# Patient Record
Sex: MALE | Race: WHITE | NOT HISPANIC OR LATINO | Employment: FULL TIME | URBAN - METROPOLITAN AREA
[De-identification: names, ages, dates, MRNs, and addresses within clinical notes are randomized per-mention and may not be internally consistent; named-entity substitution may affect disease eponyms.]

---

## 2023-07-05 LAB
INR IN PPP BY COAGULATION ASSAY: 3 (ref 0.9–1.1)
PROTHROMBIN TIME (PT) IN PPP BY COAGULATION ASSAY: 33.7 SEC (ref 9.8–12.8)

## 2023-09-27 LAB
INR IN PPP BY COAGULATION ASSAY: 2.6 (ref 0.9–1.1)
PROTHROMBIN TIME (PT) IN PPP BY COAGULATION ASSAY: 29.3 SEC (ref 9.8–12.8)

## 2023-10-03 ENCOUNTER — DOCUMENTATION (OUTPATIENT)
Dept: PRIMARY CARE | Facility: CLINIC | Age: 63
End: 2023-10-03
Payer: COMMERCIAL

## 2023-10-03 ENCOUNTER — TELEPHONE (OUTPATIENT)
Dept: PRIMARY CARE | Facility: CLINIC | Age: 63
End: 2023-10-03
Payer: COMMERCIAL

## 2023-10-03 DIAGNOSIS — R60.9 EDEMA, UNSPECIFIED TYPE: Primary | ICD-10-CM

## 2023-11-14 ENCOUNTER — TELEPHONE (OUTPATIENT)
Dept: PRIMARY CARE | Facility: CLINIC | Age: 63
End: 2023-11-14
Payer: COMMERCIAL

## 2023-12-11 ENCOUNTER — OFFICE VISIT (OUTPATIENT)
Dept: PRIMARY CARE | Facility: CLINIC | Age: 63
End: 2023-12-11
Payer: COMMERCIAL

## 2023-12-11 VITALS
SYSTOLIC BLOOD PRESSURE: 116 MMHG | DIASTOLIC BLOOD PRESSURE: 75 MMHG | BODY MASS INDEX: 24 KG/M2 | HEART RATE: 57 BPM | WEIGHT: 193 LBS | TEMPERATURE: 98 F | HEIGHT: 75 IN

## 2023-12-11 DIAGNOSIS — D68.51 FACTOR V LEIDEN (MULTI): ICD-10-CM

## 2023-12-11 DIAGNOSIS — Z13.220 SCREENING CHOLESTEROL LEVEL: ICD-10-CM

## 2023-12-11 DIAGNOSIS — Z13.29 THYROID DISORDER SCREENING: ICD-10-CM

## 2023-12-11 DIAGNOSIS — K43.9 VENTRAL HERNIA WITHOUT OBSTRUCTION OR GANGRENE: ICD-10-CM

## 2023-12-11 DIAGNOSIS — Z12.5 PROSTATE CANCER SCREENING: ICD-10-CM

## 2023-12-11 DIAGNOSIS — D64.9 ANEMIA, UNSPECIFIED TYPE: ICD-10-CM

## 2023-12-11 DIAGNOSIS — R19.00 MASS OF SOFT TISSUE OF ABDOMEN: ICD-10-CM

## 2023-12-11 DIAGNOSIS — Z23 ENCOUNTER FOR IMMUNIZATION: ICD-10-CM

## 2023-12-11 DIAGNOSIS — Z00.00 HEALTH MAINTENANCE EXAMINATION: ICD-10-CM

## 2023-12-11 DIAGNOSIS — D68.59 PROTEIN S DEFICIENCY (MULTI): Primary | ICD-10-CM

## 2023-12-11 PROBLEM — M23.8X9 MCL DEFICIENCY, KNEE: Status: ACTIVE | Noted: 2023-12-11

## 2023-12-11 PROBLEM — R21 RASH: Status: ACTIVE | Noted: 2023-12-11

## 2023-12-11 PROBLEM — R55 SYNCOPE: Status: ACTIVE | Noted: 2023-12-11

## 2023-12-11 PROBLEM — I82.5Z9: Status: ACTIVE | Noted: 2023-12-11

## 2023-12-11 PROBLEM — I82.432 ACUTE DEEP VEIN THROMBOSIS (DVT) OF POPLITEAL VEIN OF LEFT LOWER EXTREMITY (MULTI): Status: ACTIVE | Noted: 2023-12-11

## 2023-12-11 PROBLEM — H69.90 EUSTACHIAN TUBE DYSFUNCTION: Status: ACTIVE | Noted: 2023-12-11

## 2023-12-11 PROBLEM — N50.89 SWELLING OF TESTICLE PRESENT ON EXAMINATION: Status: ACTIVE | Noted: 2023-12-11

## 2023-12-11 PROBLEM — K63.5 COLON POLYPS: Status: ACTIVE | Noted: 2023-12-11

## 2023-12-11 PROBLEM — G89.29 CHRONIC PAIN OF BOTH FEET: Status: ACTIVE | Noted: 2023-12-11

## 2023-12-11 PROBLEM — M79.89 SWELLING OF LOWER EXTREMITY: Status: ACTIVE | Noted: 2023-12-11

## 2023-12-11 PROBLEM — R23.0 TOE CYANOSIS: Status: ACTIVE | Noted: 2023-12-11

## 2023-12-11 PROBLEM — L50.9 HIVES: Status: ACTIVE | Noted: 2023-12-11

## 2023-12-11 PROBLEM — D68.2 FACTOR V DEFICIENCY (MULTI): Status: ACTIVE | Noted: 2023-12-11

## 2023-12-11 PROBLEM — E77.8 HYPOPROTEINEMIA (MULTI): Status: ACTIVE | Noted: 2023-12-11

## 2023-12-11 PROBLEM — I47.20 VENTRICULAR TACHYCARDIA (MULTI): Status: ACTIVE | Noted: 2023-12-11

## 2023-12-11 PROBLEM — T78.3XXA ANGIOEDEMA: Status: ACTIVE | Noted: 2023-12-11

## 2023-12-11 PROBLEM — I77.810 DILATED AORTIC ROOT (CMS-HCC): Status: ACTIVE | Noted: 2023-12-11

## 2023-12-11 PROBLEM — I87.2 VENOUS INSUFFICIENCY: Status: ACTIVE | Noted: 2023-12-11

## 2023-12-11 PROBLEM — L97.909 LEG ULCER (MULTI): Status: ACTIVE | Noted: 2023-12-11

## 2023-12-11 PROBLEM — L97.321 SKIN ULCER OF LEFT ANKLE, LIMITED TO BREAKDOWN OF SKIN (MULTI): Status: ACTIVE | Noted: 2023-12-11

## 2023-12-11 PROBLEM — R60.9 EDEMA: Status: ACTIVE | Noted: 2023-12-11

## 2023-12-11 PROBLEM — M79.672 CHRONIC PAIN OF BOTH FEET: Status: ACTIVE | Noted: 2023-12-11

## 2023-12-11 PROBLEM — N48.89 SWELLING, PENIS: Status: ACTIVE | Noted: 2023-12-11

## 2023-12-11 PROBLEM — M79.671 CHRONIC PAIN OF BOTH FEET: Status: ACTIVE | Noted: 2023-12-11

## 2023-12-11 PROBLEM — L30.9 ECZEMA: Status: ACTIVE | Noted: 2023-12-11

## 2023-12-11 PROBLEM — I83.893 VARICOSE VEINS OF LEG WITH EDEMA, BILATERAL: Status: ACTIVE | Noted: 2023-12-11

## 2023-12-11 PROBLEM — R97.20 ELEVATED PSA: Status: ACTIVE | Noted: 2023-12-11

## 2023-12-11 PROCEDURE — 99213 OFFICE O/P EST LOW 20 MIN: CPT | Performed by: INTERNAL MEDICINE

## 2023-12-11 PROCEDURE — 90686 IIV4 VACC NO PRSV 0.5 ML IM: CPT | Performed by: INTERNAL MEDICINE

## 2023-12-11 PROCEDURE — 1036F TOBACCO NON-USER: CPT | Performed by: INTERNAL MEDICINE

## 2023-12-11 PROCEDURE — 90471 IMMUNIZATION ADMIN: CPT | Performed by: INTERNAL MEDICINE

## 2023-12-11 RX ORDER — ASCORBIC ACID 500 MG
1 TABLET ORAL DAILY
COMMUNITY

## 2023-12-11 RX ORDER — TRIAMCINOLONE ACETONIDE 1 MG/G
CREAM TOPICAL
COMMUNITY
Start: 2021-07-09 | End: 2024-02-21 | Stop reason: WASHOUT

## 2023-12-11 RX ORDER — MULTIVIT WITH IRON,MINERALS
1 TABLET,CHEWABLE ORAL DAILY
COMMUNITY

## 2023-12-11 RX ORDER — VITAMIN B COMPLEX
1 CAPSULE ORAL DAILY
COMMUNITY

## 2023-12-11 RX ORDER — WARFARIN 10 MG/1
10 TABLET ORAL EVERY EVENING
Qty: 90 TABLET | Refills: 3 | Status: SHIPPED | OUTPATIENT
Start: 2023-12-11 | End: 2024-12-10

## 2023-12-11 RX ORDER — IBUPROFEN 600 MG/1
TABLET ORAL EVERY 8 HOURS
COMMUNITY
Start: 2022-04-02 | End: 2023-12-11 | Stop reason: ALTCHOICE

## 2023-12-11 RX ORDER — SODIUM PICOSULFATE, MAGNESIUM OXIDE, AND ANHYDROUS CITRIC ACID 12; 3.5; 1 G/175ML; G/175ML; MG/175ML
LIQUID ORAL
COMMUNITY
Start: 2023-11-09 | End: 2023-12-11 | Stop reason: ALTCHOICE

## 2023-12-11 RX ORDER — OMEPRAZOLE 20 MG/1
1 TABLET, DELAYED RELEASE ORAL DAILY
COMMUNITY
End: 2023-12-11 | Stop reason: ALTCHOICE

## 2023-12-11 RX ORDER — FERROUS SULFATE 325(65) MG
1 TABLET ORAL DAILY
COMMUNITY

## 2023-12-11 RX ORDER — VIT C/E/ZN/COPPR/LUTEIN/ZEAXAN 250MG-90MG
1 CAPSULE ORAL DAILY
COMMUNITY

## 2023-12-11 RX ORDER — ENOXAPARIN SODIUM 100 MG/ML
INJECTION SUBCUTANEOUS
COMMUNITY
Start: 2023-10-31 | End: 2024-02-21 | Stop reason: WASHOUT

## 2023-12-11 RX ORDER — MELATON/THEAN/VAL/LEM/CHAM/LAV 10MG-200MG
1 TABLET,IMMED, EXTENDED RELEASE, BIPHASIC ORAL DAILY
COMMUNITY
End: 2023-12-11 | Stop reason: ALTCHOICE

## 2023-12-11 RX ORDER — WARFARIN 10 MG/1
10 TABLET ORAL
COMMUNITY
End: 2023-12-11 | Stop reason: SDUPTHER

## 2023-12-11 ASSESSMENT — ENCOUNTER SYMPTOMS
AGITATION: 0
SHORTNESS OF BREATH: 0
DIARRHEA: 0
DIZZINESS: 0
VOMITING: 0
COUGH: 0
UNEXPECTED WEIGHT CHANGE: 0
LIGHT-HEADEDNESS: 0
CONSTIPATION: 0
BLOOD IN STOOL: 0
FEVER: 0
CHILLS: 0
ABDOMINAL PAIN: 0
SORE THROAT: 0
PALPITATIONS: 0
NAUSEA: 0
HEMATURIA: 0

## 2023-12-11 ASSESSMENT — PATIENT HEALTH QUESTIONNAIRE - PHQ9
SUM OF ALL RESPONSES TO PHQ9 QUESTIONS 1 AND 2: 0
2. FEELING DOWN, DEPRESSED OR HOPELESS: NOT AT ALL
1. LITTLE INTEREST OR PLEASURE IN DOING THINGS: NOT AT ALL

## 2023-12-11 NOTE — PROGRESS NOTES
"Subjective   Patient ID: Teo Guillaume is a 63 y.o. male who presents for Establish Care and Med Refill.    HPI Patient denies any recent blood clots or bleeding issues. Patient needs refills on warfarin today. Patient was diagnosed with protein S deficiency and factor V Leiden in 2000.  Patient has been on warfarin anticoagulation since that time tolerating it well.  Denies any fever, chills, chest pain or shortness of breath, nausea, vomit diarrhea, abdominal pain.  States he has noticed a lump on the abdomen area for the last year.  It is not causing him any pain.  Was just wondering what it was.  Was concerned about possible hernia.    Review of Systems   Constitutional:  Negative for chills, fever and unexpected weight change.   HENT:  Negative for sore throat.    Eyes:  Negative for visual disturbance.   Respiratory:  Negative for cough and shortness of breath.    Cardiovascular:  Negative for chest pain and palpitations.   Gastrointestinal:  Negative for abdominal pain, blood in stool, constipation, diarrhea, nausea and vomiting.   Genitourinary:  Negative for hematuria.   Skin:  Negative for rash.   Neurological:  Negative for dizziness, syncope and light-headedness.   Psychiatric/Behavioral:  Negative for agitation.        Objective   /75   Pulse 57   Temp 36.7 °C (98 °F)   Ht 1.905 m (6' 3\")   Wt 87.5 kg (193 lb)   BMI 24.12 kg/m²     Physical Exam  Vitals and nursing note reviewed.   Constitutional:       General: He is not in acute distress.     Appearance: Normal appearance. He is normal weight. He is not ill-appearing, toxic-appearing or diaphoretic.   HENT:      Head: Normocephalic and atraumatic.      Mouth/Throat:      Mouth: Mucous membranes are moist.      Pharynx: Oropharynx is clear. No oropharyngeal exudate.   Eyes:      Pupils: Pupils are equal, round, and reactive to light.   Neck:      Vascular: No carotid bruit.   Cardiovascular:      Rate and Rhythm: Normal rate and regular " rhythm.      Pulses: Normal pulses.      Heart sounds: Normal heart sounds.   Pulmonary:      Effort: Pulmonary effort is normal.      Breath sounds: Normal breath sounds.   Abdominal:      General: Bowel sounds are normal. There is no distension.      Palpations: Abdomen is soft.      Tenderness: There is no abdominal tenderness.      Hernia: A hernia (Possible hernia versus diastases recti of the mid abdomen also about a 2 cm freely movable soft tissue mass just right of the patient's midline abdomen.) is present.   Musculoskeletal:      Cervical back: Neck supple. No tenderness.   Skin:     General: Skin is warm and dry.      Coloration: Skin is not jaundiced or pale.      Findings: No bruising.   Neurological:      General: No focal deficit present.      Mental Status: He is alert and oriented to person, place, and time.   Psychiatric:         Mood and Affect: Mood normal.         Behavior: Behavior normal.         Thought Content: Thought content normal.         Judgment: Judgment normal.         Assessment/Plan   Problem List Items Addressed This Visit             ICD-10-CM    Anemia D64.9    Relevant Orders    CBC and Auto Differential    Factor V Leiden (CMS/HCC) D68.51    Relevant Medications    enoxaparin (Lovenox) 100 mg/mL syringe    warfarin (Coumadin) 10 mg tablet    Protein S deficiency (CMS/HCC) - Primary D68.59    Relevant Medications    enoxaparin (Lovenox) 100 mg/mL syringe    warfarin (Coumadin) 10 mg tablet    Other Relevant Orders    Protime-INR    Screening cholesterol level Z13.220    Relevant Orders    Lipid panel    Prostate cancer screening Z12.5    Relevant Orders    Prostate Spec.Ag,Screen    Thyroid disorder screening Z13.29    Relevant Orders    Tsh With Reflex To Free T4 If Abnormal    Mass of soft tissue of abdomen R19.00    Relevant Orders    Referral to General Surgery    Ventral hernia without obstruction or gangrene K43.9    Relevant Orders    Referral to General Surgery     Encounter for immunization Z23    Relevant Orders    Flu vaccine (IIV4) age 6 months and greater, preservative free (Completed)     Other Visit Diagnoses         Codes    Health maintenance examination     Z00.00    Relevant Orders    Comprehensive metabolic panel          Factor V Leiden/protein S deficiency: Refills provided for warfarin.  INR monthly checks has been ordered.  Patient has had to take the medicine appropriately.  No issues of bleeding.  CBC has been ordered as well for follow-up labs.    Prostate cancer screening: Check a PSA    Thyroid disorder screening: Check a TSH    Mass of soft tissue of abdomen/ventral hernia: Possible ventral hernia versus diastases recti.  His soft tissue mass appears to be a lipoma on exam freely movable very soft.  This also could be associated with the hernia is difficult to discern.  Will have him see general surgery for further evaluation and general evaluation of this.  Currently not having any pain no evidence of incarceration, obstruction or gangrene.

## 2023-12-22 ENCOUNTER — LAB (OUTPATIENT)
Dept: LAB | Facility: LAB | Age: 63
End: 2023-12-22
Payer: COMMERCIAL

## 2023-12-22 DIAGNOSIS — D68.59 PROTEIN S DEFICIENCY (MULTI): ICD-10-CM

## 2023-12-22 LAB
INR PPP: 2.7 (ref 0.9–1.1)
PROTHROMBIN TIME: 31 SECONDS (ref 9.8–12.8)

## 2023-12-22 PROCEDURE — 85610 PROTHROMBIN TIME: CPT

## 2023-12-22 PROCEDURE — 36415 COLL VENOUS BLD VENIPUNCTURE: CPT

## 2023-12-28 ENCOUNTER — TELEPHONE (OUTPATIENT)
Dept: PRIMARY CARE | Facility: CLINIC | Age: 63
End: 2023-12-28
Payer: COMMERCIAL

## 2023-12-28 NOTE — TELEPHONE ENCOUNTER
----- Message from Jack Zhang DO sent at 12/28/2023  2:22 PM EST -----  INR is at goal with a value of 2.7.  Just wanted to let patient know to keep coumadin dosing the same. His levels are staying within the recommended range. Thank you.

## 2023-12-29 PROCEDURE — RXMED WILLOW AMBULATORY MEDICATION CHARGE

## 2024-01-03 ENCOUNTER — PHARMACY VISIT (OUTPATIENT)
Dept: PHARMACY | Facility: CLINIC | Age: 64
End: 2024-01-03
Payer: COMMERCIAL

## 2024-02-13 PROCEDURE — RXMED WILLOW AMBULATORY MEDICATION CHARGE

## 2024-02-15 ENCOUNTER — PHARMACY VISIT (OUTPATIENT)
Dept: PHARMACY | Facility: CLINIC | Age: 64
End: 2024-02-15
Payer: COMMERCIAL

## 2024-02-20 ENCOUNTER — LAB (OUTPATIENT)
Dept: LAB | Facility: LAB | Age: 64
End: 2024-02-20
Payer: COMMERCIAL

## 2024-02-20 DIAGNOSIS — D64.9 ANEMIA, UNSPECIFIED TYPE: ICD-10-CM

## 2024-02-20 DIAGNOSIS — D68.59 PROTEIN S DEFICIENCY (MULTI): ICD-10-CM

## 2024-02-20 DIAGNOSIS — Z13.220 SCREENING CHOLESTEROL LEVEL: ICD-10-CM

## 2024-02-20 DIAGNOSIS — Z12.5 PROSTATE CANCER SCREENING: ICD-10-CM

## 2024-02-20 DIAGNOSIS — Z00.00 HEALTH MAINTENANCE EXAMINATION: ICD-10-CM

## 2024-02-20 DIAGNOSIS — Z13.29 THYROID DISORDER SCREENING: ICD-10-CM

## 2024-02-20 LAB
ALBUMIN SERPL BCP-MCNC: 4.2 G/DL (ref 3.4–5)
ALP SERPL-CCNC: 58 U/L (ref 33–136)
ALT SERPL W P-5'-P-CCNC: 13 U/L (ref 10–52)
ANION GAP SERPL CALC-SCNC: 11 MMOL/L (ref 10–20)
AST SERPL W P-5'-P-CCNC: 15 U/L (ref 9–39)
BASOPHILS # BLD AUTO: 0.06 X10*3/UL (ref 0–0.1)
BASOPHILS NFR BLD AUTO: 1.2 %
BILIRUB SERPL-MCNC: 0.8 MG/DL (ref 0–1.2)
BUN SERPL-MCNC: 13 MG/DL (ref 6–23)
CALCIUM SERPL-MCNC: 9.2 MG/DL (ref 8.6–10.3)
CHLORIDE SERPL-SCNC: 104 MMOL/L (ref 98–107)
CHOLEST SERPL-MCNC: 194 MG/DL (ref 0–199)
CHOLESTEROL/HDL RATIO: 3.3
CO2 SERPL-SCNC: 31 MMOL/L (ref 21–32)
CREAT SERPL-MCNC: 0.95 MG/DL (ref 0.5–1.3)
EGFRCR SERPLBLD CKD-EPI 2021: 90 ML/MIN/1.73M*2
EOSINOPHIL # BLD AUTO: 0.21 X10*3/UL (ref 0–0.7)
EOSINOPHIL NFR BLD AUTO: 4.1 %
ERYTHROCYTE [DISTWIDTH] IN BLOOD BY AUTOMATED COUNT: 13.2 % (ref 11.5–14.5)
GLUCOSE SERPL-MCNC: 92 MG/DL (ref 74–99)
HCT VFR BLD AUTO: 43.1 % (ref 41–52)
HDLC SERPL-MCNC: 59.1 MG/DL
HGB BLD-MCNC: 14.2 G/DL (ref 13.5–17.5)
IMM GRANULOCYTES # BLD AUTO: 0.03 X10*3/UL (ref 0–0.7)
IMM GRANULOCYTES NFR BLD AUTO: 0.6 % (ref 0–0.9)
INR PPP: 2.7 (ref 0.9–1.1)
LDLC SERPL CALC-MCNC: 103 MG/DL
LYMPHOCYTES # BLD AUTO: 0.98 X10*3/UL (ref 1.2–4.8)
LYMPHOCYTES NFR BLD AUTO: 19.1 %
MCH RBC QN AUTO: 31.3 PG (ref 26–34)
MCHC RBC AUTO-ENTMCNC: 32.9 G/DL (ref 32–36)
MCV RBC AUTO: 95 FL (ref 80–100)
MONOCYTES # BLD AUTO: 0.5 X10*3/UL (ref 0.1–1)
MONOCYTES NFR BLD AUTO: 9.7 %
NEUTROPHILS # BLD AUTO: 3.35 X10*3/UL (ref 1.2–7.7)
NEUTROPHILS NFR BLD AUTO: 65.3 %
NON HDL CHOLESTEROL: 135 MG/DL (ref 0–149)
NRBC BLD-RTO: 0 /100 WBCS (ref 0–0)
PLATELET # BLD AUTO: 210 X10*3/UL (ref 150–450)
POTASSIUM SERPL-SCNC: 4.2 MMOL/L (ref 3.5–5.3)
PROT SERPL-MCNC: 6.5 G/DL (ref 6.4–8.2)
PROTHROMBIN TIME: 30.9 SECONDS (ref 9.8–12.8)
PSA SERPL-MCNC: 2.92 NG/ML
RBC # BLD AUTO: 4.53 X10*6/UL (ref 4.5–5.9)
SODIUM SERPL-SCNC: 142 MMOL/L (ref 136–145)
T4 FREE SERPL-MCNC: 0.79 NG/DL (ref 0.61–1.12)
TRIGL SERPL-MCNC: 159 MG/DL (ref 0–149)
TSH SERPL-ACNC: 4.62 MIU/L (ref 0.44–3.98)
VLDL: 32 MG/DL (ref 0–40)
WBC # BLD AUTO: 5.1 X10*3/UL (ref 4.4–11.3)

## 2024-02-20 PROCEDURE — 36415 COLL VENOUS BLD VENIPUNCTURE: CPT

## 2024-02-20 PROCEDURE — 80061 LIPID PANEL: CPT

## 2024-02-20 PROCEDURE — 84439 ASSAY OF FREE THYROXINE: CPT

## 2024-02-20 PROCEDURE — 84443 ASSAY THYROID STIM HORMONE: CPT

## 2024-02-20 PROCEDURE — 80053 COMPREHEN METABOLIC PANEL: CPT

## 2024-02-20 PROCEDURE — 85610 PROTHROMBIN TIME: CPT

## 2024-02-20 PROCEDURE — 85025 COMPLETE CBC W/AUTO DIFF WBC: CPT

## 2024-02-20 PROCEDURE — 84153 ASSAY OF PSA TOTAL: CPT

## 2024-02-21 ENCOUNTER — OFFICE VISIT (OUTPATIENT)
Dept: PRIMARY CARE | Facility: CLINIC | Age: 64
End: 2024-02-21
Payer: COMMERCIAL

## 2024-02-21 VITALS
DIASTOLIC BLOOD PRESSURE: 68 MMHG | HEIGHT: 75 IN | HEART RATE: 72 BPM | BODY MASS INDEX: 24.87 KG/M2 | SYSTOLIC BLOOD PRESSURE: 104 MMHG | TEMPERATURE: 98 F | WEIGHT: 200 LBS

## 2024-02-21 DIAGNOSIS — D68.51 FACTOR V LEIDEN (MULTI): ICD-10-CM

## 2024-02-21 DIAGNOSIS — D68.2 FACTOR V DEFICIENCY (MULTI): ICD-10-CM

## 2024-02-21 DIAGNOSIS — Z23 ENCOUNTER FOR IMMUNIZATION: ICD-10-CM

## 2024-02-21 DIAGNOSIS — D68.59 HYPERCOAGULABLE STATE, PRIMARY (MULTI): ICD-10-CM

## 2024-02-21 DIAGNOSIS — Z00.00 HEALTH MAINTENANCE EXAMINATION: Primary | ICD-10-CM

## 2024-02-21 DIAGNOSIS — D68.59 PROTEIN S DEFICIENCY (MULTI): ICD-10-CM

## 2024-02-21 PROBLEM — J02.9 PHARYNGITIS: Status: ACTIVE | Noted: 2024-02-21

## 2024-02-21 PROBLEM — M25.519 SHOULDER PAIN: Status: ACTIVE | Noted: 2024-02-21

## 2024-02-21 PROBLEM — M25.569 KNEE PAIN: Status: ACTIVE | Noted: 2024-02-21

## 2024-02-21 PROBLEM — L97.909 LEG ULCER (MULTI): Status: RESOLVED | Noted: 2023-12-11 | Resolved: 2024-02-21

## 2024-02-21 PROBLEM — N45.2 ORCHITIS: Status: ACTIVE | Noted: 2024-02-21

## 2024-02-21 PROBLEM — N43.3 HYDROCELE, BILATERAL: Status: ACTIVE | Noted: 2024-02-21

## 2024-02-21 PROBLEM — I77.810 DILATED AORTIC ROOT (CMS-HCC): Status: RESOLVED | Noted: 2023-12-11 | Resolved: 2024-02-21

## 2024-02-21 PROBLEM — D64.9 ANEMIA: Status: RESOLVED | Noted: 2023-12-11 | Resolved: 2024-02-21

## 2024-02-21 PROBLEM — I82.5Z9: Status: RESOLVED | Noted: 2023-12-11 | Resolved: 2024-02-21

## 2024-02-21 PROBLEM — I82.432 ACUTE DEEP VEIN THROMBOSIS (DVT) OF POPLITEAL VEIN OF LEFT LOWER EXTREMITY (MULTI): Status: RESOLVED | Noted: 2023-12-11 | Resolved: 2024-02-21

## 2024-02-21 PROBLEM — L97.321 SKIN ULCER OF LEFT ANKLE, LIMITED TO BREAKDOWN OF SKIN (MULTI): Status: RESOLVED | Noted: 2023-12-11 | Resolved: 2024-02-21

## 2024-02-21 PROBLEM — T78.3XXA ANGIOEDEMA: Status: RESOLVED | Noted: 2023-12-11 | Resolved: 2024-02-21

## 2024-02-21 PROBLEM — Z95.818 STATUS POST PLACEMENT OF IMPLANTABLE LOOP RECORDER: Status: ACTIVE | Noted: 2017-06-07

## 2024-02-21 PROBLEM — I47.20 VENTRICULAR TACHYCARDIA (MULTI): Status: RESOLVED | Noted: 2023-12-11 | Resolved: 2024-02-21

## 2024-02-21 PROBLEM — R06.09 DYSPNEA ON EXERTION: Status: ACTIVE | Noted: 2024-02-21

## 2024-02-21 PROBLEM — R94.01 ABNORMAL EEG: Status: ACTIVE | Noted: 2017-02-25

## 2024-02-21 PROBLEM — N45.2 ORCHITIS, BILATERAL: Status: ACTIVE | Noted: 2024-02-21

## 2024-02-21 PROBLEM — N50.9 DISORDER OF MALE GENITAL ORGANS: Status: ACTIVE | Noted: 2024-02-21

## 2024-02-21 PROBLEM — Z79.01 LONG TERM (CURRENT) USE OF ANTICOAGULANTS: Status: ACTIVE | Noted: 2018-11-30

## 2024-02-21 PROBLEM — E77.8 HYPOPROTEINEMIA (MULTI): Status: RESOLVED | Noted: 2023-12-11 | Resolved: 2024-02-21

## 2024-02-21 PROBLEM — R97.20 ELEVATED PSA: Status: RESOLVED | Noted: 2023-12-11 | Resolved: 2024-02-21

## 2024-02-21 PROBLEM — N52.9 UNABLE TO SUSTAIN ERECTION: Status: ACTIVE | Noted: 2024-02-21

## 2024-02-21 PROBLEM — D22.9 MELANOCYTIC NEVUS OF SKIN: Status: ACTIVE | Noted: 2024-02-21

## 2024-02-21 PROCEDURE — 1036F TOBACCO NON-USER: CPT | Performed by: INTERNAL MEDICINE

## 2024-02-21 PROCEDURE — 99396 PREV VISIT EST AGE 40-64: CPT | Performed by: INTERNAL MEDICINE

## 2024-02-21 PROCEDURE — 90750 HZV VACC RECOMBINANT IM: CPT | Performed by: INTERNAL MEDICINE

## 2024-02-21 PROCEDURE — 90471 IMMUNIZATION ADMIN: CPT | Performed by: INTERNAL MEDICINE

## 2024-02-21 RX ORDER — BETAMETHASONE DIPROPIONATE 0.5 MG/G
CREAM TOPICAL
COMMUNITY
Start: 2022-12-01

## 2024-02-21 ASSESSMENT — ACTIVITIES OF DAILY LIVING (ADL)
BATHING: INDEPENDENT
GROCERY_SHOPPING: INDEPENDENT
DOING_HOUSEWORK: INDEPENDENT
TAKING_MEDICATION: INDEPENDENT
DRESSING: INDEPENDENT
MANAGING_FINANCES: INDEPENDENT

## 2024-02-21 ASSESSMENT — ENCOUNTER SYMPTOMS
DIARRHEA: 0
CONSTIPATION: 0
AGITATION: 0
BLOOD IN STOOL: 0
NAUSEA: 0
SHORTNESS OF BREATH: 0
PALPITATIONS: 0
LIGHT-HEADEDNESS: 0
VOMITING: 0
DYSURIA: 0
COUGH: 0
OCCASIONAL FEELINGS OF UNSTEADINESS: 0
FEVER: 0
SORE THROAT: 0
LOSS OF SENSATION IN FEET: 0
CHILLS: 0
DEPRESSION: 0
ABDOMINAL PAIN: 0
DIZZINESS: 0

## 2024-02-21 ASSESSMENT — COLUMBIA-SUICIDE SEVERITY RATING SCALE - C-SSRS
1. IN THE PAST MONTH, HAVE YOU WISHED YOU WERE DEAD OR WISHED YOU COULD GO TO SLEEP AND NOT WAKE UP?: NO
6. HAVE YOU EVER DONE ANYTHING, STARTED TO DO ANYTHING, OR PREPARED TO DO ANYTHING TO END YOUR LIFE?: NO
2. HAVE YOU ACTUALLY HAD ANY THOUGHTS OF KILLING YOURSELF?: NO

## 2024-02-21 NOTE — PROGRESS NOTES
Subjective   Teo Guillaume is a 63 y.o. male and is here for a comprehensive physical exam. The patient reports no problems.    Do you take any herbs or supplements that were not prescribed by a doctor? yes  Are you taking calcium supplements? no  Are you taking aspirin daily? no       Do you have pain that bothers you in your daily life? not asked    Patient up-to-date on age and gender recommended screening and immunizations exception of Shingrix vaccine will be given today.  Patient overall tolerating medications well.  He is on warfarin for history of factor V deficiency/factor V Leiden/protein S deficiency hypercoagulable state.  He has been stable with an INR that was reviewed with him today and lab work stable otherwise mild elevation of LDL of 103 discussed dietary modifications for elevated triglycerides.  Has mild elevation of TSH and subclinical hypothyroidism.  Currently asymptomatic.  Denies any fever, chills, chest pain or shortness of breath, nausea, vomiting, diarrhea, lightheadedness, dizziness, palpitations.      Review of Systems   Constitutional:  Negative for chills and fever.   HENT:  Negative for sore throat.    Eyes:  Negative for visual disturbance.   Respiratory:  Negative for cough and shortness of breath.    Cardiovascular:  Negative for chest pain and palpitations.   Gastrointestinal:  Negative for abdominal pain, blood in stool, constipation, diarrhea, nausea and vomiting.   Genitourinary:  Negative for dysuria.   Skin:  Negative for rash.   Neurological:  Negative for dizziness and light-headedness.   Psychiatric/Behavioral:  Negative for agitation.         Objective   Physical Exam  Vitals and nursing note reviewed.   Constitutional:       General: He is not in acute distress.     Appearance: Normal appearance. He is not ill-appearing or toxic-appearing.   HENT:      Head: Normocephalic and atraumatic.      Mouth/Throat:      Mouth: Mucous membranes are moist.      Pharynx:  Oropharynx is clear. No oropharyngeal exudate.   Eyes:      Extraocular Movements: Extraocular movements intact.      Pupils: Pupils are equal, round, and reactive to light.   Cardiovascular:      Rate and Rhythm: Normal rate and regular rhythm.      Heart sounds: Normal heart sounds.   Pulmonary:      Effort: Pulmonary effort is normal. No respiratory distress.      Breath sounds: Normal breath sounds. No wheezing or rales.   Abdominal:      General: Bowel sounds are normal. There is no distension.      Palpations: Abdomen is soft.   Musculoskeletal:      Cervical back: Neck supple. No tenderness.   Lymphadenopathy:      Cervical: No cervical adenopathy.   Skin:     General: Skin is warm and dry.      Coloration: Skin is not jaundiced.      Findings: No bruising.   Neurological:      General: No focal deficit present.      Mental Status: He is alert and oriented to person, place, and time.   Psychiatric:         Mood and Affect: Mood normal.         Behavior: Behavior normal.         Thought Content: Thought content normal.         Judgment: Judgment normal.         Assessment/Plan   Healthy male exam.      1.  Patient up-to-date on age and gender recommended screening.  Patient up-to-date on age and gender recommended immunizations except for shingles vaccine which will be given today.  Advised to follow-up for nurse visit in 2 to 6 months for repeat dose of Shingrix vaccine.  2. Patient Counseling:  --Nutrition: Stressed importance of moderation in sodium/caffeine intake, saturated fat and cholesterol, caloric balance, sufficient intake of fresh fruits, vegetables, fiber, calcium, iron, and 1 mg of folate supplement per day (for females capable of pregnancy).  --Discussed the issue of estrogen replacement, calcium supplement, and the daily use of baby aspirin.  --Exercise: Stressed the importance of regular exercise.   --Substance Abuse: Discussed cessation/primary prevention of tobacco, alcohol, or other drug  use; driving or other dangerous activities under the influence; availability of treatment for abuse.    --Sexuality: Discussed sexually transmitted diseases, partner selection, use of condoms, avoidance of unintended pregnancy  and contraceptive alternatives.   --Injury prevention: Discussed safety belts, safety helmets, smoke detector, smoking near bedding or upholstery.   --Dental health: Discussed importance of regular tooth brushing, flossing, and dental visits.  --Immunizations reviewed.  --Discussed benefits of screening colonoscopy.  --After hours service discussed with patient  3. Discussed the patient's BMI with him.  The BMI is in the acceptable range.  4. Follow up in one year    Protein S deficiency/protein V deficiency/factor V Leiden/hypercoagulable state: Currently managed with warfarin therapy.  Patient INR within normal range she will continue monthly INR checks which have been ordered.  Follow-up in 1 year for recheck.  Sooner if any issues arise.

## 2024-03-26 PROCEDURE — RXMED WILLOW AMBULATORY MEDICATION CHARGE

## 2024-04-10 ENCOUNTER — PHARMACY VISIT (OUTPATIENT)
Dept: PHARMACY | Facility: CLINIC | Age: 64
End: 2024-04-10
Payer: COMMERCIAL

## 2024-04-19 ENCOUNTER — LAB (OUTPATIENT)
Dept: LAB | Facility: LAB | Age: 64
End: 2024-04-19
Payer: COMMERCIAL

## 2024-04-19 DIAGNOSIS — D68.59 PROTEIN S DEFICIENCY (MULTI): ICD-10-CM

## 2024-04-19 LAB
INR PPP: 2.5 (ref 0.9–1.1)
PROTHROMBIN TIME: 28.4 SECONDS (ref 9.8–12.8)

## 2024-04-19 PROCEDURE — 36415 COLL VENOUS BLD VENIPUNCTURE: CPT

## 2024-04-19 PROCEDURE — 85610 PROTHROMBIN TIME: CPT

## 2024-07-05 ENCOUNTER — LAB (OUTPATIENT)
Dept: LAB | Facility: LAB | Age: 64
End: 2024-07-05
Payer: COMMERCIAL

## 2024-07-05 DIAGNOSIS — D68.59 PROTEIN S DEFICIENCY (MULTI): ICD-10-CM

## 2024-07-05 LAB
INR PPP: 2.6 (ref 0.9–1.1)
PROTHROMBIN TIME: 29.2 SECONDS (ref 9.8–12.8)

## 2024-07-05 PROCEDURE — RXMED WILLOW AMBULATORY MEDICATION CHARGE

## 2024-07-05 PROCEDURE — 36415 COLL VENOUS BLD VENIPUNCTURE: CPT

## 2024-07-05 PROCEDURE — 85610 PROTHROMBIN TIME: CPT

## 2024-07-08 ENCOUNTER — PHARMACY VISIT (OUTPATIENT)
Dept: PHARMACY | Facility: CLINIC | Age: 64
End: 2024-07-08
Payer: COMMERCIAL

## 2024-08-02 ENCOUNTER — CLINICAL SUPPORT (OUTPATIENT)
Dept: PRIMARY CARE | Facility: CLINIC | Age: 64
End: 2024-08-02
Payer: COMMERCIAL

## 2024-08-02 ENCOUNTER — NURSE ONLY (OUTPATIENT)
Dept: PRIMARY CARE | Facility: CLINIC | Age: 64
End: 2024-08-02

## 2024-08-02 NOTE — PROGRESS NOTES
There were no vitals taken for this visit.    Past Medical History:   Diagnosis Date    Acute deep vein thrombosis (DVT) of popliteal vein of left lower extremity (Multi) 12/11/2023    Angioedema 12/11/2023    Chronic embolism and thrombosis of deep vein of distal lower extremity (Multi) 12/11/2023    Dilated aortic root (CMS-HCC) 12/11/2023    Elevated prostate specific antigen (PSA) 08/29/2017    Elevated PSA, less than 10 ng/ml    Encounter for examination of eyes and vision without abnormal findings     Encounter for vision screening    Encounter for general adult medical examination without abnormal findings 12/20/2021    Annual physical exam    Encounter for screening for other viral diseases     Encounter for screening for other viral diseases    Male erectile dysfunction, unspecified     Inability to maintain erection    Melanocytic nevi, unspecified     Numerous moles    Other conditions influencing health status     Influenza vaccine not given    Overweight     Overweight (BMI 25.0-29.9)    Pain in left knee     Left knee pain    Personal history of other diseases of the nervous system and sense organs     History of blepharitis    Personal history of other diseases of the nervous system and sense organs 12/20/2021    History of serous otitis media    Personal history of other diseases of the respiratory system 12/20/2021    History of pharyngitis    Personal history of other specified conditions     History of shortness of breath    Shortness of breath     SOBOE (shortness of breath on exertion)       Patient Active Problem List   Diagnosis    Chronic pain of both feet    Colon polyps    Eczema    Edema    Eustachian tube dysfunction    Factor V deficiency (Multi)    Factor V Leiden (Multi)    Hives    MCL deficiency, knee    Protein S deficiency (Multi)    Rash    Swelling of lower extremity    Swelling of testicle present on examination    Swelling, penis    Syncope    Toe cyanosis    Venous  insufficiency    Varicose veins of leg with edema, bilateral    Screening cholesterol level    Prostate cancer screening    Thyroid disorder screening    Mass of soft tissue of abdomen    Ventral hernia without obstruction or gangrene    Encounter for immunization    Abnormal EEG    Disorder of male genital organs    Dyspnea on exertion    Hypercoagulable state, primary (Multi)    Knee pain    Melanocytic nevus of skin    Orchitis    Personal history of DVT (deep vein thrombosis)    Pharyngitis    Shoulder pain    Sinusitis, chronic    Status post placement of implantable loop recorder    Unable to sustain erection    Hydrocele, bilateral    Orchitis, bilateral    Long term (current) use of anticoagulants    Health maintenance examination       Current Outpatient Medications   Medication Sig Dispense Refill    ascorbic acid (Vitamin C) 500 mg tablet Take 1 tablet (500 mg) by mouth once daily.      b complex vitamins capsule Take 1 capsule by mouth once daily.      betamethasone dipropionate 0.05 % cream Betamethasone Dipropionate 0.05 % External Cream Quantity: 45 Refills: 0 Ordered: 1-Dec-2022 DO Start : 1-Dec-2022 Complete      cholecalciferol (Vitamin D-3) 25 MCG (1000 UT) capsule Take 1 capsule (25 mcg) by mouth once daily.      ferrous sulfate, 325 mg ferrous sulfate, tablet Take 1 tablet by mouth once daily.      glucosamine-chondroitin 250-200 mg tablet Take 1 tablet by mouth once daily.      vitamin E, dl,tocopheryl acet, (VITAMIN E, DL, ACETATE, ORAL) Take by mouth.      warfarin (Coumadin) 10 mg tablet Take 1 tablet (10 mg) by mouth once daily in the evening. as directed 90 tablet 3     No current facility-administered medications for this visit.       CC/HPI/ASSESSMENT/PLAN    ***    There are no diagnoses linked to this encounter.

## 2024-10-04 ENCOUNTER — LAB (OUTPATIENT)
Dept: LAB | Facility: LAB | Age: 64
End: 2024-10-04
Payer: COMMERCIAL

## 2024-10-04 DIAGNOSIS — D68.59 PROTEIN S DEFICIENCY (MULTI): ICD-10-CM

## 2024-10-04 LAB
INR PPP: 3.5 (ref 0.9–1.1)
PROTHROMBIN TIME: 39.8 SECONDS (ref 9.8–12.8)

## 2024-10-04 PROCEDURE — 36415 COLL VENOUS BLD VENIPUNCTURE: CPT

## 2024-10-04 PROCEDURE — 85610 PROTHROMBIN TIME: CPT

## 2024-10-05 PROCEDURE — RXMED WILLOW AMBULATORY MEDICATION CHARGE

## 2024-10-08 ENCOUNTER — PHARMACY VISIT (OUTPATIENT)
Dept: PHARMACY | Facility: CLINIC | Age: 64
End: 2024-10-08
Payer: COMMERCIAL

## 2024-11-05 ENCOUNTER — LAB (OUTPATIENT)
Dept: LAB | Facility: LAB | Age: 64
End: 2024-11-05
Payer: COMMERCIAL

## 2024-11-05 DIAGNOSIS — D68.59 PROTEIN S DEFICIENCY (MULTI): ICD-10-CM

## 2024-11-05 LAB
INR PPP: 1.9 (ref 0.9–1.1)
PROTHROMBIN TIME: 21.9 SECONDS (ref 9.8–12.8)

## 2024-11-05 PROCEDURE — 36415 COLL VENOUS BLD VENIPUNCTURE: CPT

## 2024-11-05 PROCEDURE — 85610 PROTHROMBIN TIME: CPT

## 2025-01-08 DIAGNOSIS — D68.59 PROTEIN S DEFICIENCY (MULTI): ICD-10-CM

## 2025-01-08 RX ORDER — WARFARIN 10 MG/1
10 TABLET ORAL EVERY EVENING
Qty: 90 TABLET | Refills: 3 | Status: CANCELLED | OUTPATIENT
Start: 2025-01-08 | End: 2026-01-08

## 2025-01-10 DIAGNOSIS — D68.59 HYPERCOAGULABLE STATE, PRIMARY (MULTI): Primary | ICD-10-CM

## 2025-01-10 DIAGNOSIS — D68.59 PROTEIN S DEFICIENCY (MULTI): ICD-10-CM

## 2025-01-10 DIAGNOSIS — D68.51 FACTOR V LEIDEN (MULTI): ICD-10-CM

## 2025-01-10 RX ORDER — WARFARIN 10 MG/1
10 TABLET ORAL EVERY EVENING
Qty: 90 TABLET | Refills: 3 | Status: SHIPPED | OUTPATIENT
Start: 2025-01-10 | End: 2026-01-10

## 2025-01-13 PROCEDURE — RXMED WILLOW AMBULATORY MEDICATION CHARGE

## 2025-01-15 ENCOUNTER — LAB (OUTPATIENT)
Dept: LAB | Facility: LAB | Age: 65
End: 2025-01-15
Payer: COMMERCIAL

## 2025-01-15 ENCOUNTER — PHARMACY VISIT (OUTPATIENT)
Dept: PHARMACY | Facility: CLINIC | Age: 65
End: 2025-01-15
Payer: COMMERCIAL

## 2025-01-15 DIAGNOSIS — D68.59 PROTEIN S DEFICIENCY (MULTI): ICD-10-CM

## 2025-01-15 DIAGNOSIS — D68.51 FACTOR V LEIDEN (MULTI): ICD-10-CM

## 2025-01-15 DIAGNOSIS — D68.59 HYPERCOAGULABLE STATE, PRIMARY (MULTI): ICD-10-CM

## 2025-01-15 LAB
INR PPP: 1.8 (ref 0.9–1.1)
PROTHROMBIN TIME: 19.9 SECONDS (ref 9.8–12.8)

## 2025-01-15 PROCEDURE — 85610 PROTHROMBIN TIME: CPT

## 2025-01-23 ENCOUNTER — LAB (OUTPATIENT)
Dept: LAB | Facility: LAB | Age: 65
End: 2025-01-23
Payer: COMMERCIAL

## 2025-01-23 ENCOUNTER — APPOINTMENT (OUTPATIENT)
Dept: PRIMARY CARE | Facility: CLINIC | Age: 65
End: 2025-01-23
Payer: COMMERCIAL

## 2025-01-23 VITALS
HEART RATE: 60 BPM | SYSTOLIC BLOOD PRESSURE: 93 MMHG | DIASTOLIC BLOOD PRESSURE: 53 MMHG | TEMPERATURE: 96.8 F | BODY MASS INDEX: 24.62 KG/M2 | HEIGHT: 75 IN | WEIGHT: 198 LBS

## 2025-01-23 DIAGNOSIS — D68.59 PROTEIN S DEFICIENCY (MULTI): ICD-10-CM

## 2025-01-23 DIAGNOSIS — R68.82 DECREASED SEX DRIVE: ICD-10-CM

## 2025-01-23 DIAGNOSIS — Z00.00 HEALTH MAINTENANCE EXAMINATION: Primary | ICD-10-CM

## 2025-01-23 DIAGNOSIS — Z12.5 PROSTATE CANCER SCREENING: ICD-10-CM

## 2025-01-23 DIAGNOSIS — D68.51 FACTOR V LEIDEN (MULTI): ICD-10-CM

## 2025-01-23 DIAGNOSIS — Z13.220 SCREENING CHOLESTEROL LEVEL: ICD-10-CM

## 2025-01-23 DIAGNOSIS — D64.9 ANEMIA, UNSPECIFIED TYPE: ICD-10-CM

## 2025-01-23 DIAGNOSIS — Z13.29 THYROID DISORDER SCREENING: ICD-10-CM

## 2025-01-23 DIAGNOSIS — Z00.00 HEALTH MAINTENANCE EXAMINATION: ICD-10-CM

## 2025-01-23 DIAGNOSIS — H00.015 HORDEOLUM EXTERNUM OF LEFT LOWER EYELID: ICD-10-CM

## 2025-01-23 DIAGNOSIS — D68.59 HYPERCOAGULABLE STATE, PRIMARY (MULTI): ICD-10-CM

## 2025-01-23 DIAGNOSIS — Z71.84 TRAVEL ADVICE ENCOUNTER: ICD-10-CM

## 2025-01-23 LAB
ALBUMIN SERPL BCP-MCNC: 4.5 G/DL (ref 3.4–5)
ALP SERPL-CCNC: 64 U/L (ref 33–136)
ALT SERPL W P-5'-P-CCNC: 13 U/L (ref 10–52)
ANION GAP SERPL CALC-SCNC: 9 MMOL/L (ref 10–20)
AST SERPL W P-5'-P-CCNC: 18 U/L (ref 9–39)
BASOPHILS # BLD AUTO: 0.04 X10*3/UL (ref 0–0.1)
BASOPHILS NFR BLD AUTO: 0.8 %
BILIRUB SERPL-MCNC: 0.9 MG/DL (ref 0–1.2)
BUN SERPL-MCNC: 16 MG/DL (ref 6–23)
CALCIUM SERPL-MCNC: 9.1 MG/DL (ref 8.6–10.3)
CHLORIDE SERPL-SCNC: 105 MMOL/L (ref 98–107)
CHOLEST SERPL-MCNC: 184 MG/DL (ref 0–199)
CHOLESTEROL/HDL RATIO: 3.3
CO2 SERPL-SCNC: 31 MMOL/L (ref 21–32)
CREAT SERPL-MCNC: 1.05 MG/DL (ref 0.5–1.3)
EGFRCR SERPLBLD CKD-EPI 2021: 80 ML/MIN/1.73M*2
EOSINOPHIL # BLD AUTO: 0.23 X10*3/UL (ref 0–0.7)
EOSINOPHIL NFR BLD AUTO: 4.7 %
ERYTHROCYTE [DISTWIDTH] IN BLOOD BY AUTOMATED COUNT: 12.5 % (ref 11.5–14.5)
FERRITIN SERPL-MCNC: 74 NG/ML (ref 20–300)
GLUCOSE SERPL-MCNC: 94 MG/DL (ref 74–99)
HCT VFR BLD AUTO: 44.6 % (ref 41–52)
HDLC SERPL-MCNC: 56.5 MG/DL
HGB BLD-MCNC: 15 G/DL (ref 13.5–17.5)
IMM GRANULOCYTES # BLD AUTO: 0.01 X10*3/UL (ref 0–0.7)
IMM GRANULOCYTES NFR BLD AUTO: 0.2 % (ref 0–0.9)
INR PPP: 2.3 (ref 0.9–1.1)
IRON SATN MFR SERPL: 27 % (ref 25–45)
IRON SERPL-MCNC: 91 UG/DL (ref 35–150)
LDLC SERPL CALC-MCNC: 105 MG/DL
LYMPHOCYTES # BLD AUTO: 0.83 X10*3/UL (ref 1.2–4.8)
LYMPHOCYTES NFR BLD AUTO: 17.1 %
MCH RBC QN AUTO: 31.7 PG (ref 26–34)
MCHC RBC AUTO-ENTMCNC: 33.6 G/DL (ref 32–36)
MCV RBC AUTO: 94 FL (ref 80–100)
MONOCYTES # BLD AUTO: 0.34 X10*3/UL (ref 0.1–1)
MONOCYTES NFR BLD AUTO: 7 %
NEUTROPHILS # BLD AUTO: 3.4 X10*3/UL (ref 1.2–7.7)
NEUTROPHILS NFR BLD AUTO: 70.2 %
NON HDL CHOLESTEROL: 128 MG/DL (ref 0–149)
NRBC BLD-RTO: 0 /100 WBCS (ref 0–0)
PLATELET # BLD AUTO: 232 X10*3/UL (ref 150–450)
POTASSIUM SERPL-SCNC: 4.2 MMOL/L (ref 3.5–5.3)
PROT SERPL-MCNC: 6.6 G/DL (ref 6.4–8.2)
PROTHROMBIN TIME: 25.8 SECONDS (ref 9.8–12.8)
PSA SERPL-MCNC: 3.46 NG/ML
RBC # BLD AUTO: 4.73 X10*6/UL (ref 4.5–5.9)
SODIUM SERPL-SCNC: 141 MMOL/L (ref 136–145)
TIBC SERPL-MCNC: 336 UG/DL (ref 240–445)
TRIGL SERPL-MCNC: 115 MG/DL (ref 0–149)
TSH SERPL-ACNC: 2.27 MIU/L (ref 0.44–3.98)
UIBC SERPL-MCNC: 245 UG/DL (ref 110–370)
VIT B12 SERPL-MCNC: 279 PG/ML (ref 211–911)
VLDL: 23 MG/DL (ref 0–40)
WBC # BLD AUTO: 4.9 X10*3/UL (ref 4.4–11.3)

## 2025-01-23 PROCEDURE — 80061 LIPID PANEL: CPT

## 2025-01-23 PROCEDURE — RXMED WILLOW AMBULATORY MEDICATION CHARGE

## 2025-01-23 PROCEDURE — 99396 PREV VISIT EST AGE 40-64: CPT | Performed by: INTERNAL MEDICINE

## 2025-01-23 PROCEDURE — 84153 ASSAY OF PSA TOTAL: CPT

## 2025-01-23 PROCEDURE — 36415 COLL VENOUS BLD VENIPUNCTURE: CPT

## 2025-01-23 PROCEDURE — 99214 OFFICE O/P EST MOD 30 MIN: CPT | Performed by: INTERNAL MEDICINE

## 2025-01-23 PROCEDURE — 80053 COMPREHEN METABOLIC PANEL: CPT

## 2025-01-23 PROCEDURE — 85610 PROTHROMBIN TIME: CPT

## 2025-01-23 PROCEDURE — 1036F TOBACCO NON-USER: CPT | Performed by: INTERNAL MEDICINE

## 2025-01-23 PROCEDURE — 83550 IRON BINDING TEST: CPT

## 2025-01-23 PROCEDURE — 84402 ASSAY OF FREE TESTOSTERONE: CPT

## 2025-01-23 PROCEDURE — 83540 ASSAY OF IRON: CPT

## 2025-01-23 PROCEDURE — 82607 VITAMIN B-12: CPT

## 2025-01-23 PROCEDURE — 82728 ASSAY OF FERRITIN: CPT

## 2025-01-23 PROCEDURE — 85025 COMPLETE CBC W/AUTO DIFF WBC: CPT

## 2025-01-23 PROCEDURE — 84443 ASSAY THYROID STIM HORMONE: CPT

## 2025-01-23 PROCEDURE — 3008F BODY MASS INDEX DOCD: CPT | Performed by: INTERNAL MEDICINE

## 2025-01-23 RX ORDER — ERYTHROMYCIN 5 MG/G
OINTMENT OPHTHALMIC 4 TIMES DAILY
Qty: 3.5 G | Refills: 0 | Status: SHIPPED | OUTPATIENT
Start: 2025-01-23 | End: 2025-01-31

## 2025-01-23 ASSESSMENT — ENCOUNTER SYMPTOMS
COUGH: 0
SORE THROAT: 0
ABDOMINAL PAIN: 0
HEMATURIA: 0
DIZZINESS: 0
CONFUSION: 0
PALPITATIONS: 0
BLOOD IN STOOL: 0
DYSURIA: 0
DIARRHEA: 0
SHORTNESS OF BREATH: 0
LIGHT-HEADEDNESS: 0
CHILLS: 0
CONSTIPATION: 0
FEVER: 0
AGITATION: 0
NAUSEA: 0
VOMITING: 0

## 2025-01-23 ASSESSMENT — PATIENT HEALTH QUESTIONNAIRE - PHQ9
1. LITTLE INTEREST OR PLEASURE IN DOING THINGS: NOT AT ALL
2. FEELING DOWN, DEPRESSED OR HOPELESS: NOT AT ALL
SUM OF ALL RESPONSES TO PHQ9 QUESTIONS 1 AND 2: 0

## 2025-01-23 NOTE — PROGRESS NOTES
Subjective   Teo Guillaume is a 64 y.o. male and is here for a comprehensive physical exam. The patient reports problems - labile INR on Coumadin .    Do you take any herbs or supplements that were not prescribed by a doctor? yes  Are you taking calcium supplements? no  Are you taking aspirin daily? no       Do you have pain that bothers you in your daily life? not asked    Patient is uptodate on age and gender recommended screening.  Patient up-to-date on age and gender recommended immunizations exception of the RSV vaccine which I recommend he get at his pharmacy.  He is currently on Coumadin for history of protein S deficiency and factor V Leiden mutation.  He has had issues with labile INR over the last several months.  Patient was only going every 3 months to check his INR with his prior primary care physician because he states the INR was normal.  Over the last 6 months he has had more labile INR's with INR outside of the 2-3 range.  Most recently INR was 1.81-week ago.  Has had no signs or symptoms of blood clots.  He has seen hematology in the past.  I did speak with a hematologist today and he would be a candidate for Xarelto instead of Coumadin and patient is interested in pursuing this.  We discussed the dosing with hematology and to be 20 mg of Xarelto daily.  He would stop Coumadin and start Xarelto the next day.  If Xarelto is not affordable I did speak with her cardiologist Dr. Alfonso Benitez and he could go to the Coumadin clinic on [facility].  Patient denies any fever, chills, chest pain shortness of breath, nausea, vomiting, diarrhea.  Does admit to decreased sex drive would like to have testosterone checked.  Also due for screening blood work.    Review of Systems   Constitutional:  Negative for chills and fever.   HENT:  Negative for sore throat.    Eyes:  Negative for visual disturbance.   Respiratory:  Negative for cough and shortness of breath.    Cardiovascular:  Negative for chest pain,  palpitations and leg swelling.   Gastrointestinal:  Negative for abdominal pain, blood in stool, constipation, diarrhea, nausea and vomiting.   Genitourinary:  Negative for dysuria and hematuria.   Skin:  Negative for rash.   Neurological:  Negative for dizziness, syncope and light-headedness.   Psychiatric/Behavioral:  Negative for agitation and confusion.         Objective   Physical Exam  Vitals and nursing note reviewed.   Constitutional:       General: He is not in acute distress.     Appearance: Normal appearance. He is normal weight. He is not ill-appearing, toxic-appearing or diaphoretic.   HENT:      Head: Normocephalic and atraumatic.      Mouth/Throat:      Mouth: Mucous membranes are moist.      Pharynx: Oropharynx is clear.   Eyes:      Pupils: Pupils are equal, round, and reactive to light.      Comments: Evidence of a erythematous papule on the left lower eyelid consistent with stye   Cardiovascular:      Rate and Rhythm: Normal rate and regular rhythm.      Heart sounds: Normal heart sounds.   Pulmonary:      Effort: Pulmonary effort is normal. No respiratory distress.      Breath sounds: Normal breath sounds. No wheezing, rhonchi or rales.   Abdominal:      General: Bowel sounds are normal. There is no distension.      Palpations: Abdomen is soft.   Musculoskeletal:      Cervical back: Neck supple.      Right lower leg: No edema.      Left lower leg: No edema.   Lymphadenopathy:      Cervical: No cervical adenopathy.   Skin:     General: Skin is warm and dry.      Coloration: Skin is not jaundiced or pale.      Findings: No rash.   Neurological:      General: No focal deficit present.      Mental Status: He is alert and oriented to person, place, and time.      Cranial Nerves: No cranial nerve deficit.   Psychiatric:         Mood and Affect: Mood normal.         Behavior: Behavior normal.         Thought Content: Thought content normal.         Judgment: Judgment normal.         Assessment/Plan    Healthy male exam.      1.  Patient up-to-date on age and gender recommended screening.  Patient up-to-date on age and gender recommended immunizations exception of RSV vaccine which is recommended get at his pharmacy.  He is due for screening blood work  2. Patient Counseling:  --Nutrition: Stressed importance of moderation in sodium/caffeine intake, saturated fat and cholesterol, caloric balance, sufficient intake of fresh fruits, vegetables, fiber, calcium, iron, and 1 mg of folate supplement per day (for females capable of pregnancy).  --Discussed the issue of estrogen replacement, calcium supplement, and the daily use of baby aspirin.  --Exercise: Stressed the importance of regular exercise.   --Substance Abuse: Discussed cessation/primary prevention of tobacco, alcohol, or other drug use; driving or other dangerous activities under the influence; availability of treatment for abuse.    --Sexuality: Discussed sexually transmitted diseases, partner selection, use of condoms, avoidance of unintended pregnancy  and contraceptive alternatives.   --Injury prevention: Discussed safety belts, safety helmets, smoke detector, smoking near bedding or upholstery.   --Dental health: Discussed importance of regular tooth brushing, flossing, and dental visits.  --Immunizations reviewed.  --Discussed benefits of screening colonoscopy.  --After hours service discussed with patient  3. Discussed the patient's BMI with him.  The BMI is in the acceptable range.  4. Follow up in 3 months    Protein S and factor V Leiden deficiency/hypercoagulable state: Patient has history of DVT remotely has been on warfarin for about 25 years.  Lately has had more labile INR levels and is interested in switching to a newer novel anticoagulant such as Xarelto.  We did check with hematology Dr. Ayers and he is a candidate for this based upon his past medical history and diagnosis.  He would need to be on Xarelto 20 mg daily per hematology.   Will go ahead and send a prescription for this that is affordable and he would like to pursue it then we will discontinue Coumadin.  He would stop Coumadin and start Xarelto the next day.  His INR was 1.8 during last check 1 week ago.  If he opts to stay on Coumadin he will be referred to the Coumadin clinic.  Patient agreeable to this plan.  Will also provide referral to him to hematology to establish care.    Travel advice encounter: Patient will be traveling to [place] he inquires about medication prophylaxis for altitude sickness as well as vaccinations.    Anemia: Will check ferritin will check CBC will check total iron    Decreased sex drive: Going to check a testosterone level    Hordeolum externum of left lower eyelid: Patient was prescribed erythromycin ointment and advised warm compresses.  If symptoms or not improved to follow-up with his eye doctor    Moderate level decision making given complexity regarding switching to new novel oral anticoagulant Xarelto discussion with both cardiology and hematology.  Greater than 45 minutes was spent face-to-face in consultation regarding this.

## 2025-01-24 ENCOUNTER — PHARMACY VISIT (OUTPATIENT)
Dept: PHARMACY | Facility: CLINIC | Age: 65
End: 2025-01-24
Payer: COMMERCIAL

## 2025-01-27 LAB
TESTOSTERONE FREE (CHAN): 75 PG/ML (ref 35–155)
TESTOSTERONE,TOTAL,LC-MS/MS: 500 NG/DL (ref 250–1100)

## 2025-02-27 DIAGNOSIS — D68.51 FACTOR V LEIDEN (MULTI): ICD-10-CM

## 2025-02-27 DIAGNOSIS — D68.59 PROTEIN S DEFICIENCY (MULTI): Primary | ICD-10-CM

## 2025-02-27 DIAGNOSIS — Z86.718 PERSONAL HISTORY OF DVT (DEEP VEIN THROMBOSIS): ICD-10-CM

## 2025-02-27 DIAGNOSIS — D68.59 HYPERCOAGULABLE STATE, PRIMARY (MULTI): ICD-10-CM

## 2025-02-27 DIAGNOSIS — Z79.01 LONG TERM (CURRENT) USE OF ANTICOAGULANTS: ICD-10-CM

## 2025-02-27 DIAGNOSIS — D68.2 FACTOR V DEFICIENCY (MULTI): ICD-10-CM

## 2025-03-03 ENCOUNTER — TELEPHONE (OUTPATIENT)
Dept: CARDIOLOGY | Facility: CLINIC | Age: 65
End: 2025-03-03
Payer: COMMERCIAL

## 2025-03-03 NOTE — TELEPHONE ENCOUNTER
Left message for pt to return call at [phone].  Requested pt confirm location he would like to attend based on facility location and days of week clinic is open.

## 2025-03-04 NOTE — TELEPHONE ENCOUNTER
Phoned patient and left voicemail to return call to intake line [phone]. Awaiting for return call.

## 2025-03-05 NOTE — TELEPHONE ENCOUNTER
Spoke with patient to get him scheduled for [facility] clinic in [place]. Patient scheduled. This Lpn inform patient to stop into any lab to get CBC drawn before his inr appointment that's scheduled for Monday. Patient verbally acknowledge understanding

## 2025-03-07 ENCOUNTER — TELEPHONE (OUTPATIENT)
Dept: CARDIOLOGY | Facility: CLINIC | Age: 65
End: 2025-03-07
Payer: COMMERCIAL

## 2025-03-07 NOTE — TELEPHONE ENCOUNTER
Received a message that patient was at [facility] ( [facility] and didn't have order. Spoke with patient today and  emailed his CBC order to [email or web address] so he can take paperwork to [facility] to get blood drawn before his appointment at the coumadin clinic. Patient confirmed he received email. Patient verbally acknowledge understanding    Risks/benefits discussed with patient or patient surrogate

## 2025-03-08 LAB
ERYTHROCYTE [DISTWIDTH] IN BLOOD BY AUTOMATED COUNT: 12.5 % (ref 11–15)
HCT VFR BLD AUTO: 43.9 % (ref 38.5–50)
HGB BLD-MCNC: 14.7 G/DL (ref 13.2–17.1)
INR PPP: 2.7
MCH RBC QN AUTO: 32 PG (ref 27–33)
MCHC RBC AUTO-ENTMCNC: 33.5 G/DL (ref 32–36)
MCV RBC AUTO: 95.4 FL (ref 80–100)
PLATELET # BLD AUTO: 227 THOUSAND/UL (ref 140–400)
PMV BLD REES-ECKER: 11 FL (ref 7.5–12.5)
PROTHROMBIN TIME: 26.8 SEC (ref 9–11.5)
RBC # BLD AUTO: 4.6 MILLION/UL (ref 4.2–5.8)
WBC # BLD AUTO: 5.6 THOUSAND/UL (ref 3.8–10.8)

## 2025-03-10 ENCOUNTER — ANTICOAGULATION - WARFARIN VISIT (OUTPATIENT)
Dept: CARDIOLOGY | Facility: CLINIC | Age: 65
End: 2025-03-10
Payer: COMMERCIAL

## 2025-03-10 DIAGNOSIS — D68.59 PROTEIN S DEFICIENCY (MULTI): ICD-10-CM

## 2025-03-10 DIAGNOSIS — D68.51 FACTOR V LEIDEN (MULTI): ICD-10-CM

## 2025-03-10 DIAGNOSIS — Z79.01 LONG TERM (CURRENT) USE OF ANTICOAGULANTS: ICD-10-CM

## 2025-03-10 DIAGNOSIS — Z86.718 PERSONAL HISTORY OF DVT (DEEP VEIN THROMBOSIS): ICD-10-CM

## 2025-03-10 DIAGNOSIS — D68.59 HYPERCOAGULABLE STATE, PRIMARY (MULTI): ICD-10-CM

## 2025-03-10 DIAGNOSIS — D68.2 FACTOR V DEFICIENCY (MULTI): Primary | ICD-10-CM

## 2025-03-10 LAB
POC INR: 3.5
POC PROTHROMBIN TIME: NORMAL

## 2025-03-10 PROCEDURE — 85610 PROTHROMBIN TIME: CPT | Mod: QW

## 2025-03-10 PROCEDURE — 99211 OFF/OP EST MAY X REQ PHY/QHP: CPT

## 2025-03-10 NOTE — PROGRESS NOTES
Patient identification verified with 2 identifiers.    Location: [facility] - [address] [place] [address] [phone]     Referring Physician: Dr. Jack Zhang  Enrollment/ Re-enrollment date: 2/28/2026   INR Goal: 2.0-3.0  INR monitoring is per [facility] protocol.  Anticoagulation Medication: warfarin  Indication: Hyper Coaguable State and Factor V and Protein S deficiency and DVT    Subjective   Bleeding signs/symptoms: No    Bruising: No   Major bleeding event: No  Thrombosis signs/symptoms: No  Thromboembolic event: No  Missed doses: No  Extra doses: No  Medication changes: No  Dietary changes: No  Change in health: No  Change in activity: No  Alcohol: Yes  Pt had 2 beers over the weekend but this is typical for him. This may actually even be less alcohol than usual.  Other concerns: No    Upcoming Procedures:  Does the Patient Have any upcoming procedures that require interruption in anticoagulation therapy? no  Does the patient require bridging? no    Pt seen for NPV at [facility].  Pt was referred to this clinic by Dr. Jack Zhang for Factor V leiden, Protein S deficiency, and DVT.  Pt states that he has been on warfarin for 25 years and had been being monitored by his PCP, Dr. Barrera who is now retired and the pt is now seeing Dr. Zhang.  Educational materials very briefly discussed as pt is aware of factors influencing INR.  He was given an alert bracelet and our voicemail# to call with questions or concerns.  Pt is hoping to get approval to test every 6 weeks if INR is therapeutic.  Will need to reach out to Dr. Zhang about this.  Pt is currently taking 10mg/day of warfarin and reports that he got a new batch of warfarin from the pharmacy approx 1 month ago.    Anticoagulation Summary  As of 3/10/2025      INR goal:  2.0-3.0   TTR:  0.0%   INR used for dosing:  3.50 (3/10/2025)   Weekly warfarin total:  70 mg               Assessment/Plan   Supratherapeutic;  no clear cause.     1. New dose: no change.  Will maintain dose of 70mg warfarin weekly as pt has been on this dose for several years.  2. Next INR: 1 week.  If therapeutic, ok to recheck INR in 2 weeks.      Education provided to patient during the visit:  Patient instructed to call in interim with questions, concerns and changes.   Patient educated on interactions between medications and warfarin.   Patient educated on dietary consistency in vitamin k consumption.   Patient educated on affects of alcohol consumption while taking warfarin.   Patient educated on signs of bleeding/clotting.   Patient educated on compliance with dosing, follow up appointments, and prescribed plan of care.

## 2025-03-18 ENCOUNTER — ANTICOAGULATION - WARFARIN VISIT (OUTPATIENT)
Dept: CARDIOLOGY | Facility: CLINIC | Age: 65
End: 2025-03-18
Payer: COMMERCIAL

## 2025-03-18 DIAGNOSIS — D68.51 FACTOR V LEIDEN (MULTI): ICD-10-CM

## 2025-03-18 DIAGNOSIS — D68.59 HYPERCOAGULABLE STATE, PRIMARY (MULTI): ICD-10-CM

## 2025-03-18 DIAGNOSIS — D68.59 PROTEIN S DEFICIENCY (MULTI): ICD-10-CM

## 2025-03-18 DIAGNOSIS — Z86.718 PERSONAL HISTORY OF DVT (DEEP VEIN THROMBOSIS): ICD-10-CM

## 2025-03-18 DIAGNOSIS — D68.2 FACTOR V DEFICIENCY (MULTI): Primary | ICD-10-CM

## 2025-03-18 DIAGNOSIS — Z79.01 LONG TERM (CURRENT) USE OF ANTICOAGULANTS: ICD-10-CM

## 2025-03-18 NOTE — PROGRESS NOTES
Pt called [facility] and I spoke to him.  Pt was calling to Cx appt, for INR test on Wed. 3/19.  Pt stated that he held 1 dose as instructed but the missed another dose 3 days later.  Pt stated he will be out of town traveling with earliest availability on 3/28.  Pt r/s for Wed. 4/2 in the AM which is first available that works with his schedule.

## 2025-03-19 ENCOUNTER — APPOINTMENT (OUTPATIENT)
Dept: CARDIOLOGY | Facility: CLINIC | Age: 65
End: 2025-03-19
Payer: COMMERCIAL

## 2025-04-02 ENCOUNTER — ANTICOAGULATION - WARFARIN VISIT (OUTPATIENT)
Dept: CARDIOLOGY | Facility: CLINIC | Age: 65
End: 2025-04-02
Payer: COMMERCIAL

## 2025-04-02 DIAGNOSIS — D68.59 PROTEIN S DEFICIENCY (MULTI): ICD-10-CM

## 2025-04-02 DIAGNOSIS — D68.51 FACTOR V LEIDEN (MULTI): ICD-10-CM

## 2025-04-02 DIAGNOSIS — D68.2 FACTOR V DEFICIENCY (MULTI): Primary | ICD-10-CM

## 2025-04-02 DIAGNOSIS — Z86.718 PERSONAL HISTORY OF DVT (DEEP VEIN THROMBOSIS): ICD-10-CM

## 2025-04-02 DIAGNOSIS — D68.59 HYPERCOAGULABLE STATE, PRIMARY (MULTI): ICD-10-CM

## 2025-04-02 DIAGNOSIS — Z79.01 LONG TERM (CURRENT) USE OF ANTICOAGULANTS: ICD-10-CM

## 2025-04-02 LAB
POC INR: 2.1
POC PROTHROMBIN TIME: NORMAL

## 2025-04-02 PROCEDURE — 99211 OFF/OP EST MAY X REQ PHY/QHP: CPT

## 2025-04-02 PROCEDURE — 85610 PROTHROMBIN TIME: CPT | Mod: QW

## 2025-04-02 NOTE — PROGRESS NOTES
Patient identification verified with 2 identifiers.    Location: [facility] - [address] [place] [address] [phone]     Referring Physician: Dr. Jack Zhang  Enrollment/ Re-enrollment date: 2026   INR Goal: 2.0-3.0  INR monitoring is per [facility] protocol.  Anticoagulation Medication: warfarin  Indication: Hyper Coaguable State and Factor V and Protein S deficiency and DVT    Subjective   Bleeding signs/symptoms: No    Bruising: No   Major bleeding event: No  Thrombosis signs/symptoms: No  Thromboembolic event: No  Missed doses: No  Extra doses: No  Medication changes: No  Dietary changes: No  Change in health: No  Change in activity: No  Alcohol: No  Other concerns: No    Upcoming Procedures:  Does the Patient Have any upcoming procedures that require interruption in anticoagulation therapy? no  Does the patient require bridging? no      Anticoagulation Summary  As of 2025      INR goal:  2.0-3.0   TTR:  62.6% (3.3 wk)   INR used for dosin.10 (2025)   Weekly warfarin total:  70 mg               Assessment/Plan   Therapeutic     1. New dose: Patient has been on Coumadin for 25 years and was previously being managed by his PCP who retired.  Patient states he normally went 2 months between checks, but understands our protocol of monthly INR checks when therapeutic.  Dose maintained and patient will return in 4 weeks.    2. Next INR: 1 month      Education provided to patient during the visit:  Patient instructed to call in interim with questions, concerns and changes.

## 2025-04-08 ENCOUNTER — PHARMACY VISIT (OUTPATIENT)
Dept: PHARMACY | Facility: CLINIC | Age: 65
End: 2025-04-08
Payer: COMMERCIAL

## 2025-04-08 PROCEDURE — RXMED WILLOW AMBULATORY MEDICATION CHARGE

## 2025-04-11 DIAGNOSIS — I83.893 VARICOSE VEINS OF LEG WITH EDEMA, BILATERAL: ICD-10-CM

## 2025-04-11 DIAGNOSIS — Z86.718 PERSONAL HISTORY OF DVT (DEEP VEIN THROMBOSIS): ICD-10-CM

## 2025-04-11 DIAGNOSIS — D68.51 FACTOR V LEIDEN (MULTI): ICD-10-CM

## 2025-04-11 DIAGNOSIS — D68.59 HYPERCOAGULABLE STATE, PRIMARY (MULTI): ICD-10-CM

## 2025-04-11 DIAGNOSIS — D68.59 PROTEIN S DEFICIENCY (MULTI): Primary | ICD-10-CM

## 2025-05-06 DIAGNOSIS — H02.9 LESION OF LEFT LOWER EYELID: Primary | ICD-10-CM

## 2025-05-07 ENCOUNTER — ANTICOAGULATION - WARFARIN VISIT (OUTPATIENT)
Dept: CARDIOLOGY | Facility: CLINIC | Age: 65
End: 2025-05-07
Payer: COMMERCIAL

## 2025-05-07 DIAGNOSIS — Z79.01 LONG TERM (CURRENT) USE OF ANTICOAGULANTS: ICD-10-CM

## 2025-05-07 DIAGNOSIS — Z86.718 PERSONAL HISTORY OF DVT (DEEP VEIN THROMBOSIS): ICD-10-CM

## 2025-05-07 DIAGNOSIS — D68.2 FACTOR V DEFICIENCY (MULTI): Primary | ICD-10-CM

## 2025-05-07 DIAGNOSIS — D68.59 PROTEIN S DEFICIENCY (MULTI): ICD-10-CM

## 2025-05-07 DIAGNOSIS — D68.59 HYPERCOAGULABLE STATE, PRIMARY (MULTI): ICD-10-CM

## 2025-05-07 DIAGNOSIS — D68.51 FACTOR V LEIDEN (MULTI): ICD-10-CM

## 2025-05-07 LAB
POC INR: 3.1 (ref 0.9–1.1)
POC PROTHROMBIN TIME: ABNORMAL (ref 9.3–12.5)

## 2025-05-07 PROCEDURE — 85610 PROTHROMBIN TIME: CPT | Mod: QW

## 2025-05-07 PROCEDURE — 99211 OFF/OP EST MAY X REQ PHY/QHP: CPT

## 2025-05-07 NOTE — PROGRESS NOTES
"Patient identification verified with 2 identifiers.    Location: [facility] - [address] [place] [id number] [phone]     Referring Physician: Dr. Jack Zhang  Enrollment/ Re-enrollment date: 2/28/2026   INR Goal: 2.0-3.0  INR monitoring is per [facility] protocol.  Anticoagulation Medication: warfarin  Indication: Hyper Coaguable State, Factor V and Protein S deficiency and DVT    Subjective   Bleeding signs/symptoms: No  Bruising: No   Major bleeding event: No  Thrombosis signs/symptoms: No  Thromboembolic event: No  Missed doses: No  Extra doses: No  Medication changes: No  Dietary changes: No  Change in health: No  Change in activity: No  Alcohol: No  Other concerns: No    Upcoming Procedures:  Does the Patient Have any upcoming procedures that require interruption in anticoagulation therapy? no  Does the patient require bridging? no      Anticoagulation Summary  As of 5/7/2025      INR goal:  2.0-3.0   TTR:  79.2% (1.9 mo)   INR used for dosing:  3.10 (5/7/2025)   Weekly warfarin total:  70 mg               Assessment/Plan   Supratherapeutic     1. New dose: Dose maintained.   2. Next INR: 1 week. Pt was advised to RTC in 1 week to ensure his INR returns within range. Pt states, \"he has been on warfarin for 25yrs and is not worried about it\" Pt declined to return in 1 week, requested appt be scheduled for 4 weeks.       Education provided to patient during the visit:  Patient instructed to call in interim with questions, concerns and changes.   Patient educated on interactions between medications and warfarin.   Patient educated on dietary consistency in vitamin k consumption.   Patient educated on affects of alcohol consumption while taking warfarin.   Patient educated on signs of bleeding/clotting.   Patient educated on compliance with dosing, follow up appointments, and prescribed plan of care.        "

## 2025-05-09 PROCEDURE — RXMED WILLOW AMBULATORY MEDICATION CHARGE

## 2025-05-13 ENCOUNTER — PHARMACY VISIT (OUTPATIENT)
Dept: PHARMACY | Facility: CLINIC | Age: 65
End: 2025-05-13
Payer: COMMERCIAL

## 2025-06-04 ENCOUNTER — ANTICOAGULATION - WARFARIN VISIT (OUTPATIENT)
Dept: CARDIOLOGY | Facility: CLINIC | Age: 65
End: 2025-06-04
Payer: COMMERCIAL

## 2025-06-04 DIAGNOSIS — D68.2 FACTOR V DEFICIENCY (MULTI): Primary | ICD-10-CM

## 2025-06-04 DIAGNOSIS — D68.51 FACTOR V LEIDEN (MULTI): ICD-10-CM

## 2025-06-04 DIAGNOSIS — D68.59 HYPERCOAGULABLE STATE, PRIMARY (MULTI): ICD-10-CM

## 2025-06-04 DIAGNOSIS — D68.59 PROTEIN S DEFICIENCY (MULTI): ICD-10-CM

## 2025-06-04 DIAGNOSIS — Z86.718 PERSONAL HISTORY OF DVT (DEEP VEIN THROMBOSIS): ICD-10-CM

## 2025-06-04 DIAGNOSIS — Z79.01 LONG TERM (CURRENT) USE OF ANTICOAGULANTS: ICD-10-CM

## 2025-06-04 LAB
POC INR: 5.5 (ref 0.9–1.1)
POC PROTHROMBIN TIME: ABNORMAL (ref 9.3–12.5)

## 2025-06-04 PROCEDURE — 85610 PROTHROMBIN TIME: CPT | Mod: QW

## 2025-06-04 PROCEDURE — 99211 OFF/OP EST MAY X REQ PHY/QHP: CPT

## 2025-06-04 NOTE — PROGRESS NOTES
Patient identification verified with 2 identifiers.    Location: [facility] - [address] [place] [id number] [phone]     Referring Physician: Dr. Jack Zhang  Enrollment/ Re-enrollment date: 2026   INR Goal: 2.0-3.0  INR monitoring is per [facility] protocol.  Anticoagulation Medication: warfarin  Indication: Hyper Coaguable State, Factor V and Protein S deficiency and DVT    Subjective   Bleeding signs/symptoms: No  Bruising: No   Major bleeding event: No  Thrombosis signs/symptoms: No  Thromboembolic event: No  Missed doses: No  Extra doses: No  Medication changes: Yes  Dietary changes: No  Change in health: No  Change in activity: No  Alcohol: No  Other concerns: No    Upcoming Procedures:  Does the Patient Have any upcoming procedures that require interruption in anticoagulation therapy? no  Does the patient require bridging? no      Anticoagulation Summary  As of 2025      INR goal:  2.0-3.0   TTR:  53.5% (2.9 mo)   INR used for dosin.50 (2025)   Weekly warfarin total:  70 mg               Assessment/Plan   Supratherapeutic     1. New dose: HOLD WARFARIN DOSE TODAY () AND TOMORROW () PER PROTOCOL  2. Next INR: 2 DAYS PER PROTOCOL      Education provided to patient during the visit:  Patient instructed to call in interim with questions, concerns and changes.   Patient educated on interactions between medications and warfarin.   Patient educated on dietary consistency in vitamin k consumption.   Patient educated on affects of alcohol consumption while taking warfarin.   Patient educated on signs of bleeding/clotting.   Patient educated on compliance with dosing, follow up appointments, and prescribed plan of care.

## 2025-06-06 ENCOUNTER — ANTICOAGULATION - WARFARIN VISIT (OUTPATIENT)
Dept: CARDIOLOGY | Facility: CLINIC | Age: 65
End: 2025-06-06
Payer: COMMERCIAL

## 2025-06-06 ENCOUNTER — ANTICOAGULATION - WARFARIN VISIT (OUTPATIENT)
Dept: CARDIOLOGY | Facility: CLINIC | Age: 65
End: 2025-06-06

## 2025-06-06 DIAGNOSIS — Z79.01 LONG TERM (CURRENT) USE OF ANTICOAGULANTS: ICD-10-CM

## 2025-06-06 DIAGNOSIS — D68.59 PROTEIN S DEFICIENCY (MULTI): ICD-10-CM

## 2025-06-06 DIAGNOSIS — D68.59 HYPERCOAGULABLE STATE, PRIMARY (MULTI): ICD-10-CM

## 2025-06-06 DIAGNOSIS — Z86.718 PERSONAL HISTORY OF DVT (DEEP VEIN THROMBOSIS): ICD-10-CM

## 2025-06-06 DIAGNOSIS — D68.51 FACTOR V LEIDEN (MULTI): ICD-10-CM

## 2025-06-06 DIAGNOSIS — D68.2 FACTOR V DEFICIENCY (MULTI): Primary | ICD-10-CM

## 2025-06-06 LAB
POC INR: 2.3 (ref 2–3)
POC PROTHROMBIN TIME: ABNORMAL (ref 9.3–12.5)

## 2025-06-06 PROCEDURE — 99211 OFF/OP EST MAY X REQ PHY/QHP: CPT

## 2025-06-06 PROCEDURE — 85610 PROTHROMBIN TIME: CPT | Mod: QW | Performed by: INTERNAL MEDICINE

## 2025-06-06 NOTE — PROGRESS NOTES
Patient identification verified with 2 identifiers.    [facility] [address] [place] [id number] [phone]     Referring Physician: Dr. Jack Zhang DO  Enrollment/ Re-enrollment date: 2026   INR Goal: 2.0-3.0  INR monitoring is per [facility] protocol.  Anticoagulation Medication: warfarin  Indication: Hyper Coaguable State, Factor V and Protein S deficiency and DVT    Subjective   Pt present by himself for today's visit.    Bleeding signs/symptoms: No.  Pt denies.    Bruising: No.  Pt denies.  Major bleeding event: No  Thrombosis signs/symptoms: No  Thromboembolic event: No  Missed doses: Yes.  Pt held Two doses of warfarin as instructed on  and 25.  Extra doses: No  Medication changes: Yes.  Pt is on Medrol dose pack, second day of 20mg.  He was 6 days left.  He is being treated for a skin rash.  Dietary changes: No.  Pt denies.  Change in health: No.  Pt denies.  Change in activity: No.  Pt denies.  Alcohol: No  Other concerns: No    Upcoming Procedures:  Does the Patient Have any upcoming procedures that require interruption in anticoagulation therapy? no  Does the patient require bridging? no    Dose maintained after last visit but a Two day dose hold was done on  and 25.  Pt is taking 70mg of warfarin weekly.  Anticoagulation Summary  As of 2025      INR goal:  2.0-3.0   TTR:  52.8% (2.9 mo)   INR used for dosin.30 (2025)   Weekly warfarin total:  70 mg               Assessment/Plan   Therapeutic     1. New dose:  no change.  Maintain dose.  70mg weekly.  2. Next INR: 6 days.  Pt comes to clinic once a month for INR testing.  3. Pt will be out of town starting next Fri.  Has appt. With Dr. Ram next Thursday.      Education provided to patient during the visit:  Patient instructed to call in interim with questions, concerns and changes.   Patient educated on interactions between medications and warfarin.   Patient educated on dietary  consistency in vitamin k consumption.   Patient educated on affects of alcohol consumption while taking warfarin.   Patient educated on signs of bleeding/clotting.   Patient educated on compliance with dosing, follow up appointments, and prescribed plan of care.

## 2025-06-06 NOTE — PROGRESS NOTES
Pt called later morning after visit and left VM requesting to r/s INR appt. To Wed. 6/11 or Mon. 6/16.  I r/s Pt for 6/11 at 0815 per his request.  I called Pt and left VM with new appt. Date and time.

## 2025-06-09 NOTE — PROGRESS NOTES
Patient ID: Teo Guillaume is a 65 y.o. male.  Referring Physician: Jack Zhang DO  [address] [place] [address]  Primary Care Provider: Jack Zhang DO      Subjective    HPI  Teo Guillaume is a 65 y.o. male presenting for initial consultation at the request of Dr. Jack Zhang for Protein S deficiency.     He gives a personal history of Protein S deficiency and history of one DVT in the left leg.     Patient's past medical history, surgical history, family history and social history reviewed.    Review of Systems:   Review of Systems:    Positive per HPI, otherwise negative.       Objective   BSA: 2.1 meters squared  /69 (BP Location: Right arm, Patient Position: Sitting)   Pulse 60   Temp 36.8 °C (98.2 °F) (Temporal)   Resp 16   Wt 83.1 kg (183 lb 3.2 oz)   SpO2 98%   BMI 22.90 kg/m²     Family History[1]  Oncology History    No history exists.       Teo Guillaume  reports that he has never smoked. He has never used smokeless tobacco.  He  reports current alcohol use of about 5.0 standard drinks of alcohol per week.  He  reports no history of drug use.    Physical Exam  Gen: appears well in clinic, NAD  HEENT: atraumatic head, normocephalic, EOMI, conjunctiva normal  LUNG: no increased WOB, CTAB  CV: No JVD. RRR  GI: soft, NT, ND  LE: no LE edema  Skin: no obvious rashes or lesions on visible skin  Neuro: interactive, no focal deficits noted  Psych: normal mood and affect      Performance Status:  Symptomatic; fully ambulatory    Labs/Imaging/Pathology: Personally reviewed reports and images in Epic electronic medical record system. Pertinent results as it related to the plan represented in below in assessment and plan.         Assessment/Plan    History of left lower limb DVT 25 years ago at age 40 years    Heterozygous for Factor 5 Leiden and Protein S deficiency     - Patient has been on Coumadin for over 25 years and has tolerated well   - However, more recently INR  has been more super therapeutic   - He had a 15 lb weight loss which could have been contributory; denies major changes in diet   - We discussed no contraindications to switching to a direct oral anticoagulant    - Right now, the cost was prohibitive and his plan was to stay on warfarin until age 66 and on Medicare   - sent prescription to specialty pharmacy and it is available for $10 / month and pharmacy Janette parra reaching out to discuss   - He knows to call with any new symptoms   - RTC 1 year    RTC 1 year or as neeeded if he prefers Dr Zhang takes over his prescription  This note has been transcribed using a medical scribe and there is a possibility of unintentional typing misprints    Diagnoses and all orders for this visit:  Protein S deficiency (Multi)  -     Referral To Hematology and Oncology  -     apixaban (Eliquis) 5 mg tablet; Take 1 tablet (5 mg) by mouth 2 times a day.  Factor V Leiden (Multi)  -     Referral To Hematology and Oncology  -     apixaban (Eliquis) 5 mg tablet; Take 1 tablet (5 mg) by mouth 2 times a day.  Hypercoagulable state, primary (Multi)  -     Referral To Hematology and Oncology  History of DVT in adulthood  -     apixaban (Eliquis) 5 mg tablet; Take 1 tablet (5 mg) by mouth 2 times a day.      Maureen Ram MD  Hematology/Oncology  [facility]      Scribe Attestation  By signing my name below, I, Maris Li, attest that this documentation has been prepared under the direction and in the presence of Maureen Ram MD.    Time Spent  Prep time on day of patient encounter: 5 minutes  Time spent directly with patient, family or caregiver: 25 minutes  Additional Time Spent on Patient Care Activities: 7 minutes  Documentation Time: 5 minutes  Other Time Spent: 0 minutes  Total: 42 minutes                 [1] No family history on file.

## 2025-06-11 ENCOUNTER — ANTICOAGULATION - WARFARIN VISIT (OUTPATIENT)
Dept: CARDIOLOGY | Facility: CLINIC | Age: 65
End: 2025-06-11
Payer: COMMERCIAL

## 2025-06-11 DIAGNOSIS — D68.51 FACTOR V LEIDEN (MULTI): ICD-10-CM

## 2025-06-11 DIAGNOSIS — Z79.01 LONG TERM (CURRENT) USE OF ANTICOAGULANTS: ICD-10-CM

## 2025-06-11 DIAGNOSIS — Z86.718 PERSONAL HISTORY OF DVT (DEEP VEIN THROMBOSIS): ICD-10-CM

## 2025-06-11 DIAGNOSIS — D68.59 HYPERCOAGULABLE STATE, PRIMARY (MULTI): ICD-10-CM

## 2025-06-11 DIAGNOSIS — D68.59 PROTEIN S DEFICIENCY (MULTI): ICD-10-CM

## 2025-06-11 DIAGNOSIS — D68.2 FACTOR V DEFICIENCY (MULTI): Primary | ICD-10-CM

## 2025-06-11 LAB
POC INR: 4.5 (ref 0.9–1.1)
POC PROTHROMBIN TIME: ABNORMAL (ref 9.3–12.5)

## 2025-06-11 PROCEDURE — 99211 OFF/OP EST MAY X REQ PHY/QHP: CPT

## 2025-06-11 PROCEDURE — 85610 PROTHROMBIN TIME: CPT | Mod: QW

## 2025-06-11 NOTE — PROGRESS NOTES
Patient identification verified with 2 identifiers.    Location: [facility] - [address] [place] [id number] [phone]     Referring Physician: Dr. Jack Zhang DO  Enrollment/ Re-enrollment date: 2026   INR Goal: 2.0-3.0  INR monitoring is per [facility] protocol.  Anticoagulation Medication: warfarin  Indication: Hyper Coaguable State, Factor V and Protein S deficiency and DVT    Subjective   Bleeding signs/symptoms: No    Bruising: No   Major bleeding event: No  Thrombosis signs/symptoms: No  Thromboembolic event: No  Missed doses: No  Extra doses: No  Medication changes: No  Dietary changes: No  Patient did nt eat his normal amount of greens this past week.  Change in health: No  Change in activity: No  Alcohol: No  Other concerns: No    Upcoming Procedures:  Does the Patient Have any upcoming procedures that require interruption in anticoagulation therapy? no  Does the patient require bridging? no      Anticoagulation Summary  As of 2025      INR goal:  2.0-3.0   TTR:  51.7% (3.1 mo)   INR used for dosin.50 (2025)   Weekly warfarin total:  70 mg               Assessment/Plan   Supratherapeutic     1. New dose: Will  maintain this dose and patient will return in 1 week.     2. Next INR: 1 week      Education provided to patient during the visit:  Patient instructed to call in interim with questions, concerns and changes.

## 2025-06-12 ENCOUNTER — APPOINTMENT (OUTPATIENT)
Dept: CARDIOLOGY | Facility: CLINIC | Age: 65
End: 2025-06-12
Payer: COMMERCIAL

## 2025-06-12 ENCOUNTER — SPECIALTY PHARMACY (OUTPATIENT)
Dept: PHARMACY | Facility: CLINIC | Age: 65
End: 2025-06-12

## 2025-06-12 ENCOUNTER — OFFICE VISIT (OUTPATIENT)
Dept: HEMATOLOGY/ONCOLOGY | Facility: CLINIC | Age: 65
End: 2025-06-12
Payer: COMMERCIAL

## 2025-06-12 VITALS
DIASTOLIC BLOOD PRESSURE: 69 MMHG | BODY MASS INDEX: 22.9 KG/M2 | TEMPERATURE: 98.2 F | RESPIRATION RATE: 16 BRPM | HEART RATE: 60 BPM | WEIGHT: 183.2 LBS | SYSTOLIC BLOOD PRESSURE: 113 MMHG | OXYGEN SATURATION: 98 %

## 2025-06-12 DIAGNOSIS — D68.59 HYPERCOAGULABLE STATE, PRIMARY (MULTI): ICD-10-CM

## 2025-06-12 DIAGNOSIS — D68.59 PROTEIN S DEFICIENCY (MULTI): Primary | ICD-10-CM

## 2025-06-12 DIAGNOSIS — D68.51 FACTOR V LEIDEN (MULTI): ICD-10-CM

## 2025-06-12 DIAGNOSIS — Z86.718 HISTORY OF DVT IN ADULTHOOD: ICD-10-CM

## 2025-06-12 PROCEDURE — 99214 OFFICE O/P EST MOD 30 MIN: CPT | Performed by: INTERNAL MEDICINE

## 2025-06-12 PROCEDURE — 99244 OFF/OP CNSLTJ NEW/EST MOD 40: CPT | Performed by: INTERNAL MEDICINE

## 2025-06-12 ASSESSMENT — ENCOUNTER SYMPTOMS
LOSS OF SENSATION IN FEET: 0
DEPRESSION: 0
OCCASIONAL FEELINGS OF UNSTEADINESS: 0

## 2025-06-12 ASSESSMENT — PAIN SCALES - GENERAL: PAINLEVEL_OUTOF10: 0-NO PAIN

## 2025-06-19 ENCOUNTER — ANTICOAGULATION - WARFARIN VISIT (OUTPATIENT)
Dept: CARDIOLOGY | Facility: CLINIC | Age: 65
End: 2025-06-19
Payer: COMMERCIAL

## 2025-06-19 DIAGNOSIS — D68.2 FACTOR V DEFICIENCY (MULTI): Primary | ICD-10-CM

## 2025-06-19 DIAGNOSIS — D68.59 HYPERCOAGULABLE STATE, PRIMARY (MULTI): ICD-10-CM

## 2025-06-19 DIAGNOSIS — Z86.718 PERSONAL HISTORY OF DVT (DEEP VEIN THROMBOSIS): ICD-10-CM

## 2025-06-19 DIAGNOSIS — D68.59 PROTEIN S DEFICIENCY (MULTI): ICD-10-CM

## 2025-06-19 DIAGNOSIS — Z79.01 LONG TERM (CURRENT) USE OF ANTICOAGULANTS: ICD-10-CM

## 2025-06-19 DIAGNOSIS — D68.51 FACTOR V LEIDEN (MULTI): ICD-10-CM

## 2025-06-19 LAB
POC INR: 1.5 (ref 0.9–1.1)
POC PROTHROMBIN TIME: ABNORMAL (ref 9.3–12.5)

## 2025-06-19 PROCEDURE — 85610 PROTHROMBIN TIME: CPT | Mod: QW

## 2025-06-19 PROCEDURE — 99211 OFF/OP EST MAY X REQ PHY/QHP: CPT

## 2025-06-19 NOTE — PROGRESS NOTES
Patient identification verified with 2 identifiers.    Location: [facility] - [address] [place] [id number] [phone]     Referring Physician: Dr. Jack Zhang DO  Enrollment/ Re-enrollment date: 2026   INR Goal: 2.0-3.0  INR monitoring is per [facility] protocol.  Anticoagulation Medication: warfarin  Indication: Hyper Coaguable State, Factor V and Protein S deficiency and DVT    Subjective   Bleeding signs/symptoms: No    Bruising: No   Major bleeding event: No  Thrombosis signs/symptoms: No  Thromboembolic event: No  Missed doses: Yes  Pt held 2  doses of warfarin as advised then accidentally missed a dose (so total of 3 held doses last week).  Extra doses: No  Medication changes: No  Dietary changes: No  Change in health: No  Change in activity: No  Alcohol: No  Other concerns: No    Upcoming Procedures:  Does the Patient Have any upcoming procedures that require interruption in anticoagulation therapy? no  Does the patient require bridging? no    Pt instructed to hold 2 doses of warfarin after last visit  and  then resume usual dose.  Pt is currently taking 70mg warfarin weekly.  Anticoagulation Summary  As of 2025      INR goal:  2.0-3.0   TTR:  50.2% (3.4 mo)   INR used for dosin.50 (2025)   Weekly warfarin total:  70 mg               Assessment/Plan   Subtherapeutic     1. New dose: Will  maintain this dose and patient will return in 1 week.     2. Next INR: 1 week      Education provided to patient during the visit:  Patient instructed to call in interim with questions, concerns and changes.

## 2025-06-20 ENCOUNTER — SPECIALTY PHARMACY (OUTPATIENT)
Dept: PHARMACY | Facility: CLINIC | Age: 65
End: 2025-06-20

## 2025-06-20 ENCOUNTER — PHARMACY VISIT (OUTPATIENT)
Dept: PHARMACY | Facility: CLINIC | Age: 65
End: 2025-06-20
Payer: COMMERCIAL

## 2025-06-20 PROCEDURE — RXMED WILLOW AMBULATORY MEDICATION CHARGE

## 2025-06-27 ENCOUNTER — APPOINTMENT (OUTPATIENT)
Dept: CARDIOLOGY | Facility: CLINIC | Age: 65
End: 2025-06-27
Payer: COMMERCIAL

## 2025-06-30 ENCOUNTER — ANTICOAGULATION - WARFARIN VISIT (OUTPATIENT)
Dept: CARDIOLOGY | Facility: CLINIC | Age: 65
End: 2025-06-30
Payer: COMMERCIAL

## 2025-06-30 DIAGNOSIS — D68.59 HYPERCOAGULABLE STATE, PRIMARY (MULTI): ICD-10-CM

## 2025-06-30 DIAGNOSIS — Z86.718 PERSONAL HISTORY OF DVT (DEEP VEIN THROMBOSIS): ICD-10-CM

## 2025-06-30 DIAGNOSIS — D68.59 PROTEIN S DEFICIENCY (MULTI): ICD-10-CM

## 2025-06-30 DIAGNOSIS — Z79.01 LONG TERM (CURRENT) USE OF ANTICOAGULANTS: ICD-10-CM

## 2025-06-30 DIAGNOSIS — D68.51 FACTOR V LEIDEN (MULTI): ICD-10-CM

## 2025-06-30 DIAGNOSIS — D68.2 FACTOR V DEFICIENCY (MULTI): Primary | ICD-10-CM

## 2025-06-30 NOTE — PROGRESS NOTES
Spoke to pt who confirmed that he started Eliquis last week and he will no longer need to come to our coumadin clinic.  Will resolve his episode.

## 2025-07-14 ENCOUNTER — PHARMACY VISIT (OUTPATIENT)
Dept: PHARMACY | Facility: CLINIC | Age: 65
End: 2025-07-14
Payer: COMMERCIAL

## 2025-07-14 PROCEDURE — RXMED WILLOW AMBULATORY MEDICATION CHARGE

## 2025-08-12 PROCEDURE — RXMED WILLOW AMBULATORY MEDICATION CHARGE

## 2025-08-19 DIAGNOSIS — D68.59 PROTEIN S DEFICIENCY (MULTI): ICD-10-CM

## 2025-08-19 DIAGNOSIS — D68.59 HYPERCOAGULABLE STATE, PRIMARY (MULTI): ICD-10-CM

## 2025-08-19 DIAGNOSIS — D68.51 FACTOR V LEIDEN (MULTI): ICD-10-CM

## 2025-08-20 ENCOUNTER — PHARMACY VISIT (OUTPATIENT)
Dept: PHARMACY | Facility: CLINIC | Age: 65
End: 2025-08-20
Payer: COMMERCIAL